# Patient Record
Sex: FEMALE | Race: WHITE | NOT HISPANIC OR LATINO | Employment: OTHER | URBAN - METROPOLITAN AREA
[De-identification: names, ages, dates, MRNs, and addresses within clinical notes are randomized per-mention and may not be internally consistent; named-entity substitution may affect disease eponyms.]

---

## 2023-11-09 ENCOUNTER — HOSPITAL ENCOUNTER (OUTPATIENT)
Facility: HOSPITAL | Age: 78
Setting detail: OBSERVATION
Discharge: HOME HEALTH | End: 2023-11-16
Attending: EMERGENCY MEDICINE | Admitting: STUDENT IN AN ORGANIZED HEALTH CARE EDUCATION/TRAINING PROGRAM
Payer: MEDICARE

## 2023-11-09 ENCOUNTER — APPOINTMENT (OUTPATIENT)
Dept: CT IMAGING | Facility: HOSPITAL | Age: 78
End: 2023-11-09
Payer: MEDICARE

## 2023-11-09 ENCOUNTER — APPOINTMENT (OUTPATIENT)
Dept: GENERAL RADIOLOGY | Facility: HOSPITAL | Age: 78
End: 2023-11-09
Payer: MEDICARE
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
## 2023-11-09 DIAGNOSIS — N39.0 URINARY TRACT INFECTION WITHOUT HEMATURIA, SITE UNSPECIFIED: ICD-10-CM

## 2023-11-09 DIAGNOSIS — E87.6 HYPOKALEMIA: ICD-10-CM

## 2023-11-09 DIAGNOSIS — R53.1 GENERALIZED WEAKNESS: Primary | ICD-10-CM

## 2023-11-09 LAB
ALBUMIN SERPL-MCNC: 3.3 G/DL (ref 3.5–5.2)
ALBUMIN/GLOB SERPL: 0.9 G/DL
ALP SERPL-CCNC: 166 U/L (ref 39–117)
ALT SERPL W P-5'-P-CCNC: 56 U/L (ref 1–33)
ANION GAP SERPL CALCULATED.3IONS-SCNC: 12 MMOL/L (ref 5–15)
AST SERPL-CCNC: 82 U/L (ref 1–32)
BACTERIA UR QL AUTO: ABNORMAL /HPF
BASOPHILS # BLD AUTO: 0.03 10*3/MM3 (ref 0–0.2)
BASOPHILS NFR BLD AUTO: 0.1 % (ref 0–1.5)
BILIRUB SERPL-MCNC: 0.5 MG/DL (ref 0–1.2)
BILIRUB UR QL STRIP: NEGATIVE
BUN SERPL-MCNC: 45 MG/DL (ref 8–23)
BUN/CREAT SERPL: 40.2 (ref 7–25)
CALCIUM SPEC-SCNC: 9.6 MG/DL (ref 8.6–10.5)
CHLORIDE SERPL-SCNC: 106 MMOL/L (ref 98–107)
CK SERPL-CCNC: 95 U/L (ref 20–180)
CLARITY UR: ABNORMAL
CO2 SERPL-SCNC: 24 MMOL/L (ref 22–29)
COLOR UR: YELLOW
CREAT SERPL-MCNC: 1.12 MG/DL (ref 0.57–1)
D-LACTATE SERPL-SCNC: 1.3 MMOL/L (ref 0.5–2)
DEPRECATED RDW RBC AUTO: 40.8 FL (ref 37–54)
EGFRCR SERPLBLD CKD-EPI 2021: 50.4 ML/MIN/1.73
EOSINOPHIL # BLD AUTO: 0 10*3/MM3 (ref 0–0.4)
EOSINOPHIL NFR BLD AUTO: 0 % (ref 0.3–6.2)
ERYTHROCYTE [DISTWIDTH] IN BLOOD BY AUTOMATED COUNT: 12.5 % (ref 12.3–15.4)
ETHANOL BLD-MCNC: <10 MG/DL (ref 0–10)
ETHANOL UR QL: <0.01 %
GLOBULIN UR ELPH-MCNC: 3.8 GM/DL
GLUCOSE SERPL-MCNC: 93 MG/DL (ref 65–99)
GLUCOSE UR STRIP-MCNC: NEGATIVE MG/DL
HCT VFR BLD AUTO: 35.2 % (ref 34–46.6)
HGB BLD-MCNC: 11.9 G/DL (ref 12–15.9)
HGB UR QL STRIP.AUTO: ABNORMAL
HYALINE CASTS UR QL AUTO: ABNORMAL /LPF
IMM GRANULOCYTES # BLD AUTO: 0.16 10*3/MM3 (ref 0–0.05)
IMM GRANULOCYTES NFR BLD AUTO: 0.8 % (ref 0–0.5)
KETONES UR QL STRIP: ABNORMAL
LEUKOCYTE ESTERASE UR QL STRIP.AUTO: ABNORMAL
LYMPHOCYTES # BLD AUTO: 1.55 10*3/MM3 (ref 0.7–3.1)
LYMPHOCYTES NFR BLD AUTO: 7.7 % (ref 19.6–45.3)
MAGNESIUM SERPL-MCNC: 1.9 MG/DL (ref 1.6–2.4)
MCH RBC QN AUTO: 29.9 PG (ref 26.6–33)
MCHC RBC AUTO-ENTMCNC: 33.8 G/DL (ref 31.5–35.7)
MCV RBC AUTO: 88.4 FL (ref 79–97)
MONOCYTES # BLD AUTO: 2.83 10*3/MM3 (ref 0.1–0.9)
MONOCYTES NFR BLD AUTO: 14.1 % (ref 5–12)
NEUTROPHILS NFR BLD AUTO: 15.55 10*3/MM3 (ref 1.7–7)
NEUTROPHILS NFR BLD AUTO: 77.3 % (ref 42.7–76)
NITRITE UR QL STRIP: POSITIVE
NRBC BLD AUTO-RTO: 0 /100 WBC (ref 0–0.2)
PH UR STRIP.AUTO: 6 [PH] (ref 5–8)
PLATELET # BLD AUTO: 341 10*3/MM3 (ref 140–450)
PMV BLD AUTO: 11.2 FL (ref 6–12)
POTASSIUM SERPL-SCNC: 2.8 MMOL/L (ref 3.5–5.2)
PROT SERPL-MCNC: 7.1 G/DL (ref 6–8.5)
PROT UR QL STRIP: ABNORMAL
RBC # BLD AUTO: 3.98 10*6/MM3 (ref 3.77–5.28)
RBC # UR STRIP: ABNORMAL /HPF
REF LAB TEST METHOD: ABNORMAL
SODIUM SERPL-SCNC: 142 MMOL/L (ref 136–145)
SP GR UR STRIP: 1.02 (ref 1–1.03)
SQUAMOUS #/AREA URNS HPF: ABNORMAL /HPF
UROBILINOGEN UR QL STRIP: ABNORMAL
WBC # UR STRIP: ABNORMAL /HPF
WBC NRBC COR # BLD: 20.12 10*3/MM3 (ref 3.4–10.8)

## 2023-11-09 PROCEDURE — 82550 ASSAY OF CK (CPK): CPT | Performed by: EMERGENCY MEDICINE

## 2023-11-09 PROCEDURE — 99284 EMERGENCY DEPT VISIT MOD MDM: CPT

## 2023-11-09 PROCEDURE — 70450 CT HEAD/BRAIN W/O DYE: CPT

## 2023-11-09 PROCEDURE — G0378 HOSPITAL OBSERVATION PER HR: HCPCS

## 2023-11-09 PROCEDURE — 85025 COMPLETE CBC W/AUTO DIFF WBC: CPT | Performed by: EMERGENCY MEDICINE

## 2023-11-09 PROCEDURE — 80053 COMPREHEN METABOLIC PANEL: CPT | Performed by: EMERGENCY MEDICINE

## 2023-11-09 PROCEDURE — 25810000003 SODIUM CHLORIDE 0.9 % SOLUTION: Performed by: EMERGENCY MEDICINE

## 2023-11-09 PROCEDURE — 87186 SC STD MICRODIL/AGAR DIL: CPT | Performed by: EMERGENCY MEDICINE

## 2023-11-09 PROCEDURE — 25010000002 CEFTRIAXONE PER 250 MG: Performed by: EMERGENCY MEDICINE

## 2023-11-09 PROCEDURE — 87040 BLOOD CULTURE FOR BACTERIA: CPT | Performed by: EMERGENCY MEDICINE

## 2023-11-09 PROCEDURE — 83735 ASSAY OF MAGNESIUM: CPT | Performed by: PHYSICIAN ASSISTANT

## 2023-11-09 PROCEDURE — 36415 COLL VENOUS BLD VENIPUNCTURE: CPT

## 2023-11-09 PROCEDURE — 71045 X-RAY EXAM CHEST 1 VIEW: CPT

## 2023-11-09 PROCEDURE — 87086 URINE CULTURE/COLONY COUNT: CPT | Performed by: EMERGENCY MEDICINE

## 2023-11-09 PROCEDURE — 81001 URINALYSIS AUTO W/SCOPE: CPT | Performed by: EMERGENCY MEDICINE

## 2023-11-09 PROCEDURE — 96365 THER/PROPH/DIAG IV INF INIT: CPT

## 2023-11-09 PROCEDURE — 82077 ASSAY SPEC XCP UR&BREATH IA: CPT | Performed by: STUDENT IN AN ORGANIZED HEALTH CARE EDUCATION/TRAINING PROGRAM

## 2023-11-09 PROCEDURE — 72125 CT NECK SPINE W/O DYE: CPT

## 2023-11-09 PROCEDURE — 83605 ASSAY OF LACTIC ACID: CPT | Performed by: EMERGENCY MEDICINE

## 2023-11-09 RX ORDER — POTASSIUM CHLORIDE 750 MG/1
40 TABLET, FILM COATED, EXTENDED RELEASE ORAL ONCE
Status: COMPLETED | OUTPATIENT
Start: 2023-11-09 | End: 2023-11-09

## 2023-11-09 RX ORDER — SODIUM CHLORIDE 0.9 % (FLUSH) 0.9 %
10 SYRINGE (ML) INJECTION AS NEEDED
Status: DISCONTINUED | OUTPATIENT
Start: 2023-11-09 | End: 2023-11-16 | Stop reason: HOSPADM

## 2023-11-09 RX ORDER — SODIUM CHLORIDE 9 MG/ML
40 INJECTION, SOLUTION INTRAVENOUS AS NEEDED
Status: DISCONTINUED | OUTPATIENT
Start: 2023-11-09 | End: 2023-11-16 | Stop reason: HOSPADM

## 2023-11-09 RX ORDER — SODIUM CHLORIDE 9 MG/ML
75 INJECTION, SOLUTION INTRAVENOUS CONTINUOUS
Status: DISCONTINUED | OUTPATIENT
Start: 2023-11-09 | End: 2023-11-14

## 2023-11-09 RX ORDER — SODIUM CHLORIDE 0.9 % (FLUSH) 0.9 %
10 SYRINGE (ML) INJECTION EVERY 12 HOURS SCHEDULED
Status: DISCONTINUED | OUTPATIENT
Start: 2023-11-09 | End: 2023-11-16 | Stop reason: HOSPADM

## 2023-11-09 RX ORDER — AMOXICILLIN 250 MG
2 CAPSULE ORAL 2 TIMES DAILY
Status: DISCONTINUED | OUTPATIENT
Start: 2023-11-09 | End: 2023-11-16 | Stop reason: HOSPADM

## 2023-11-09 RX ORDER — BISACODYL 10 MG
10 SUPPOSITORY, RECTAL RECTAL DAILY PRN
Status: DISCONTINUED | OUTPATIENT
Start: 2023-11-09 | End: 2023-11-16 | Stop reason: HOSPADM

## 2023-11-09 RX ORDER — BISACODYL 5 MG/1
5 TABLET, DELAYED RELEASE ORAL DAILY PRN
Status: DISCONTINUED | OUTPATIENT
Start: 2023-11-09 | End: 2023-11-16 | Stop reason: HOSPADM

## 2023-11-09 RX ORDER — POLYETHYLENE GLYCOL 3350 17 G/17G
17 POWDER, FOR SOLUTION ORAL DAILY PRN
Status: DISCONTINUED | OUTPATIENT
Start: 2023-11-09 | End: 2023-11-16 | Stop reason: HOSPADM

## 2023-11-09 RX ORDER — GABAPENTIN 800 MG/1
800 TABLET ORAL AS NEEDED
COMMUNITY
End: 2023-11-16 | Stop reason: HOSPADM

## 2023-11-09 RX ORDER — POTASSIUM CHLORIDE 1.5 G/1.58G
40 POWDER, FOR SOLUTION ORAL 2 TIMES DAILY
Status: COMPLETED | OUTPATIENT
Start: 2023-11-10 | End: 2023-11-10

## 2023-11-09 RX ADMIN — SODIUM CHLORIDE 500 ML: 9 INJECTION, SOLUTION INTRAVENOUS at 18:10

## 2023-11-09 RX ADMIN — CEFTRIAXONE SODIUM 1000 MG: 1 INJECTION, POWDER, FOR SOLUTION INTRAMUSCULAR; INTRAVENOUS at 19:51

## 2023-11-09 RX ADMIN — POTASSIUM CHLORIDE 40 MEQ: 750 TABLET, EXTENDED RELEASE ORAL at 20:49

## 2023-11-09 NOTE — ED PROVIDER NOTES
EMERGENCY DEPARTMENT ENCOUNTER    Room Number:  E667/1  PCP: Dary Mcbride APRN  Patient Care Team:  Dary Mcbride APRN as PCP - General (Family Medicine)   Independent Historians: Patient, family, EMS    HPI:  Chief Complaint: Fall    A complete HPI/ROS/PMH/PSH/SH/FH are unobtainable due to: Nothing    Chronic or social conditions impacting patient care (Social Determinants of Health): None  (Financial Resource Strain / Food Insecurity / Transportation Needs / Physical Activity / Stress / Social Connections / Intimate Partner Violence / Housing Stability)    Context: Sammi Curtis is a 78 y.o. female who presents to the ED c/o acute fall.  The patient reports that she was walking in her room last night and she got too weak to stand and she fell.  She reports she struck her head.  She reports no loss of consciousness.  She reports pain to her neck.  She states she was too weak to get up so she laid on the ground all night.  She denies prior similar episodes.  She denies any other injury.  Family found her today and came here.  She denies any recent illness.  She has no chest pain or shortness of breath.        Prescription drug monitoring program review:         PAST MEDICAL HISTORY  Active Ambulatory Problems     Diagnosis Date Noted    No Active Ambulatory Problems     Resolved Ambulatory Problems     Diagnosis Date Noted    No Resolved Ambulatory Problems     Past Medical History:   Diagnosis Date    Arthritis     Disease of thyroid gland     Elevated cholesterol          PAST SURGICAL HISTORY  History reviewed. No pertinent surgical history.      FAMILY HISTORY  History reviewed. No pertinent family history.      SOCIAL HISTORY  Social History     Socioeconomic History    Marital status:    Tobacco Use    Smoking status: Former     Packs/day: 2.00     Years: 25.00     Additional pack years: 0.00     Total pack years: 50.00     Types: Cigarettes     Quit date: 1991     Years since  quittin.8    Smokeless tobacco: Never   Vaping Use    Vaping Use: Never used   Substance and Sexual Activity    Alcohol use: Never    Drug use: Never    Sexual activity: Defer         ALLERGIES  Patient has no known allergies.        REVIEW OF SYSTEMS  Review of Systems  Included in HPI  All systems reviewed and negative except for those discussed in HPI.      PHYSICAL EXAM    I have reviewed the triage vital signs and nursing notes.    ED Triage Vitals [23 1652]   Temp Heart Rate Resp BP SpO2   98.2 °F (36.8 °C) 76 18 138/60 94 %      Temp src Heart Rate Source Patient Position BP Location FiO2 (%)   Oral Monitor Sitting Left arm --       Physical Exam  GENERAL: Awake, alert, no acute distress smells strongly of urine.  SKIN: Warm, dry  HENT: Normocephalic, atraumatic  EYES: no scleral icterus  CV: regular rhythm, regular rate  RESPIRATORY: normal effort, lungs clear  ABDOMEN: soft, nontender, nondistended  MUSCULOSKELETAL: no deformity, equal lower extremity strength and sensation.  No thoracic or lumbar spine tenderness.  She has mild diffuse cervical spine tenderness.  No tenderness to the hips, knees, elbows or shoulders.  NEURO: alert, moves all extremities, follows commands                                                               LAB RESULTS  Recent Results (from the past 24 hour(s))   Basic Metabolic Panel    Collection Time: 11/10/23  6:42 AM    Specimen: Blood   Result Value Ref Range    Glucose 107 (H) 65 - 99 mg/dL    BUN 35 (H) 8 - 23 mg/dL    Creatinine 1.01 (H) 0.57 - 1.00 mg/dL    Sodium 143 136 - 145 mmol/L    Potassium 3.1 (L) 3.5 - 5.2 mmol/L    Chloride 109 (H) 98 - 107 mmol/L    CO2 24.4 22.0 - 29.0 mmol/L    Calcium 9.0 8.6 - 10.5 mg/dL    BUN/Creatinine Ratio 34.7 (H) 7.0 - 25.0    Anion Gap 9.6 5.0 - 15.0 mmol/L    eGFR 57.1 (L) >60.0 mL/min/1.73   CBC (No Diff)    Collection Time: 11/10/23  6:42 AM    Specimen: Blood   Result Value Ref Range    WBC 16.58 (H) 3.40 - 10.80  10*3/mm3    RBC 3.47 (L) 3.77 - 5.28 10*6/mm3    Hemoglobin 10.5 (L) 12.0 - 15.9 g/dL    Hematocrit 30.9 (L) 34.0 - 46.6 %    MCV 89.0 79.0 - 97.0 fL    MCH 30.3 26.6 - 33.0 pg    MCHC 34.0 31.5 - 35.7 g/dL    RDW 12.8 12.3 - 15.4 %    RDW-SD 41.5 37.0 - 54.0 fl    MPV 10.9 6.0 - 12.0 fL    Platelets 287 140 - 450 10*3/mm3   Magnesium    Collection Time: 11/10/23  6:42 AM    Specimen: Blood   Result Value Ref Range    Magnesium 2.1 1.6 - 2.4 mg/dL   Hepatic Function Panel    Collection Time: 11/10/23  6:42 AM    Specimen: Blood   Result Value Ref Range    Total Protein 6.3 6.0 - 8.5 g/dL    Albumin 2.9 (L) 3.5 - 5.2 g/dL    ALT (SGPT) 74 (H) 1 - 33 U/L    AST (SGOT) 115 (H) 1 - 32 U/L    Alkaline Phosphatase 144 (H) 39 - 117 U/L    Total Bilirubin 0.4 0.0 - 1.2 mg/dL    Bilirubin, Direct <0.2 0.0 - 0.3 mg/dL    Bilirubin, Indirect     Hepatitis Panel, Acute    Collection Time: 11/10/23  6:47 AM    Specimen: Blood   Result Value Ref Range    Hepatitis B Surface Ag Non-Reactive Non-Reactive    Hep A IgM Non-Reactive Non-Reactive    Hep B C IgM Non-Reactive Non-Reactive    Hepatitis C Ab Non-Reactive Non-Reactive       Ordered the above labs and independently reviewed the results.        RADIOLOGY  No Radiology Exams Resulted Within Past 24 Hours    I ordered the above noted radiological studies. Reviewed by me and discussed with radiologist.  See dictation for official radiology interpretation.      PROCEDURES    Procedures      MEDICATIONS GIVEN IN ER    Medications   sodium chloride 0.9 % flush 10 mL (has no administration in time range)   sodium chloride 0.9 % flush 10 mL (10 mL Intravenous Given 11/10/23 2033)   sodium chloride 0.9 % flush 10 mL (has no administration in time range)   sodium chloride 0.9 % infusion 40 mL (has no administration in time range)   sennosides-docusate (PERICOLACE) 8.6-50 MG per tablet 2 tablet (2 tablets Oral Given 11/10/23 2033)     And   polyethylene glycol (MIRALAX) packet 17 g (has  no administration in time range)     And   bisacodyl (DULCOLAX) EC tablet 5 mg (has no administration in time range)     And   bisacodyl (DULCOLAX) suppository 10 mg (has no administration in time range)   sodium chloride 0.9 % infusion (0 mL/hr Intravenous Hold 11/10/23 1013)   calcium carbonate (TUMS) chewable tablet 500 mg (200 mg elemental) (2 tablets Oral Given 11/10/23 2032)   Potassium Replacement - Follow Nurse / BPA Driven Protocol (has no administration in time range)   Magnesium Standard Dose Replacement - Follow Nurse / BPA Driven Protocol (has no administration in time range)   Phosphorus Replacement - Follow Nurse / BPA Driven Protocol (has no administration in time range)   Calcium Replacement - Follow Nurse / BPA Driven Protocol (has no administration in time range)   levoFLOXacin (LEVAQUIN) tablet 750 mg (750 mg Oral Given 11/10/23 1437)   levothyroxine (SYNTHROID, LEVOTHROID) tablet 112 mcg (has no administration in time range)   losartan (COZAAR) tablet 25 mg (25 mg Oral Given 11/10/23 1437)   sertraline (ZOLOFT) tablet 50 mg (50 mg Oral Given 11/10/23 1437)   Enoxaparin Sodium (LOVENOX) syringe 40 mg (40 mg Subcutaneous Given 11/10/23 1437)   sodium chloride 0.9 % bolus 500 mL (0 mL Intravenous Stopped 11/9/23 1854)   cefTRIAXone (ROCEPHIN) 1,000 mg in sodium chloride 0.9 % 100 mL IVPB-VTB (0 mg Intravenous Stopped 11/9/23 2033)   potassium chloride (K-DUR,KLOR-CON) ER tablet 40 mEq (40 mEq Oral Given 11/9/23 2049)   potassium chloride (KLOR-CON) packet 40 mEq (40 mEq Oral Given 11/10/23 2032)         ORDERS PLACED DURING THIS VISIT:  Orders Placed This Encounter   Procedures    Blood Culture - Blood,    Blood Culture - Blood,    Urine Culture - Urine,    CT Head Without Contrast    CT Cervical Spine Without Contrast    XR Chest 1 View    Comprehensive Metabolic Panel    Urinalysis With Microscopic If Indicated (No Culture) - Urine, Clean Catch    CK    CBC Auto Differential    Urinalysis,  Microscopic Only - Urine, Clean Catch    Lactic Acid, Plasma    Magnesium    Basic Metabolic Panel    CBC (No Diff)    Hepatitis Panel, Acute    Ethanol    Magnesium    Hepatic Function Panel    Comprehensive Metabolic Panel    CBC (No Diff)    Diet: Regular/House Diet; Texture: Regular Texture (IDDSI 7); Fluid Consistency: Thin (IDDSI 0)    Monitor Blood Pressure    Pulse Oximetry, Continuous    Vital Signs    Intake & Output    Weigh Patient    Oral Care    Saline Lock & Maintain IV Access    Place Sequential Compression Device    Maintain Sequential Compression Device    Code Status and Medical Interventions:    LHA (on-call MD unless specified) Details    OT Consult: Eval & Treat    OT Plan of Care Cert / Re-Cert    PT Consult: Eval & Treat Discharge Placement Assessment    SCANNED - TELEMETRY      SCANNED - TELEMETRY      SCANNED - TELEMETRY      SCANNED - TELEMETRY      SCANNED - TELEMETRY      Insert Peripheral IV    Insert Peripheral IV    Initiate Observation Status    CBC & Differential         PROGRESS, DATA ANALYSIS, CONSULTS, AND MEDICAL DECISION MAKING    All labs have been independently interpreted by me.  All radiology studies have been reviewed by me and discussed with radiologist dictating the report.   EKG's independently viewed and interpreted by me.  Discussion below represents my analysis of pertinent findings related to patient's condition, differential diagnosis, treatment plan and final disposition.    Differential diagnosis includes but is not limited to rhabdomyolysis, UTI, metabolic derangement.    ED Course as of 11/11/23 0003   u Nov 09, 2023   1759 XR Chest 1 View  My independent interpretation of the chest x-ray is no dense consolidation [TR]   1925 The patient has a UTI and generalized weakness.  Starting antibiotics.  Plan admission for further treatment.  The patient and family are agreeable. [TR]   2042 Care transferred to Ean lewis PA-C pending hospitalist admission. [TR]    2107 Discussed the patient's case with Dr Arreaga.  To admit to his care in tele/obs status. [RC]      ED Course User Index  [RC] [employer] PA  [TR] Boyd Hough MD                  AS OF 00:03 EST VITALS:    BP - 138/75  HR - 88  TEMP - 99.5 °F (37.5 °C) (Oral)  O2 SATS - 94%        DIAGNOSIS  Final diagnoses:   Generalized weakness   Urinary tract infection without hematuria, site unspecified   Hypokalemia         DISPOSITION  ED Disposition       ED Disposition   Decision to Admit    Condition   --    Comment   Level of Care: Telemetry [5]   Diagnosis: Acute UTI [583640]   Admitting Physician: ROXIE ARREAGA [[id number]]   Attending Physician: ROXIE ARREAGA [[id number]]                    Note Disclaimer: At [facility], we believe that sharing information builds trust and better relationships. You are receiving this note because you recently visited [facility]. It is possible you will see health information before a provider has talked with you about it. This kind of information can be easy to misunderstand. To help you fully understand what it means for your health, we urge you to discuss this note with your provider.         Boyd Hough MD  11/09/23 2043       Boyd Hough MD  11/11/23 0003

## 2023-11-10 LAB
ALBUMIN SERPL-MCNC: 2.9 G/DL (ref 3.5–5.2)
ALP SERPL-CCNC: 144 U/L (ref 39–117)
ALT SERPL W P-5'-P-CCNC: 74 U/L (ref 1–33)
ANION GAP SERPL CALCULATED.3IONS-SCNC: 9.6 MMOL/L (ref 5–15)
AST SERPL-CCNC: 115 U/L (ref 1–32)
BILIRUB CONJ SERPL-MCNC: <0.2 MG/DL (ref 0–0.3)
BILIRUB INDIRECT SERPL-MCNC: ABNORMAL MG/DL
BILIRUB SERPL-MCNC: 0.4 MG/DL (ref 0–1.2)
BUN SERPL-MCNC: 35 MG/DL (ref 8–23)
BUN/CREAT SERPL: 34.7 (ref 7–25)
CALCIUM SPEC-SCNC: 9 MG/DL (ref 8.6–10.5)
CHLORIDE SERPL-SCNC: 109 MMOL/L (ref 98–107)
CO2 SERPL-SCNC: 24.4 MMOL/L (ref 22–29)
CREAT SERPL-MCNC: 1.01 MG/DL (ref 0.57–1)
DEPRECATED RDW RBC AUTO: 41.5 FL (ref 37–54)
EGFRCR SERPLBLD CKD-EPI 2021: 57.1 ML/MIN/1.73
ERYTHROCYTE [DISTWIDTH] IN BLOOD BY AUTOMATED COUNT: 12.8 % (ref 12.3–15.4)
GLUCOSE SERPL-MCNC: 107 MG/DL (ref 65–99)
HAV IGM SERPL QL IA: NORMAL
HBV CORE IGM SERPL QL IA: NORMAL
HBV SURFACE AG SERPL QL IA: NORMAL
HCT VFR BLD AUTO: 30.9 % (ref 34–46.6)
HCV AB SER DONR QL: NORMAL
HGB BLD-MCNC: 10.5 G/DL (ref 12–15.9)
MAGNESIUM SERPL-MCNC: 2.1 MG/DL (ref 1.6–2.4)
MCH RBC QN AUTO: 30.3 PG (ref 26.6–33)
MCHC RBC AUTO-ENTMCNC: 34 G/DL (ref 31.5–35.7)
MCV RBC AUTO: 89 FL (ref 79–97)
PLATELET # BLD AUTO: 287 10*3/MM3 (ref 140–450)
PMV BLD AUTO: 10.9 FL (ref 6–12)
POTASSIUM SERPL-SCNC: 3.1 MMOL/L (ref 3.5–5.2)
PROT SERPL-MCNC: 6.3 G/DL (ref 6–8.5)
RBC # BLD AUTO: 3.47 10*6/MM3 (ref 3.77–5.28)
SODIUM SERPL-SCNC: 143 MMOL/L (ref 136–145)
WBC NRBC COR # BLD: 16.58 10*3/MM3 (ref 3.4–10.8)

## 2023-11-10 PROCEDURE — 97162 PT EVAL MOD COMPLEX 30 MIN: CPT | Performed by: PHYSICAL THERAPIST

## 2023-11-10 PROCEDURE — 97166 OT EVAL MOD COMPLEX 45 MIN: CPT

## 2023-11-10 PROCEDURE — 80074 ACUTE HEPATITIS PANEL: CPT | Performed by: STUDENT IN AN ORGANIZED HEALTH CARE EDUCATION/TRAINING PROGRAM

## 2023-11-10 PROCEDURE — 97535 SELF CARE MNGMENT TRAINING: CPT

## 2023-11-10 PROCEDURE — 25010000002 ENOXAPARIN PER 10 MG: Performed by: STUDENT IN AN ORGANIZED HEALTH CARE EDUCATION/TRAINING PROGRAM

## 2023-11-10 PROCEDURE — 80076 HEPATIC FUNCTION PANEL: CPT | Performed by: STUDENT IN AN ORGANIZED HEALTH CARE EDUCATION/TRAINING PROGRAM

## 2023-11-10 PROCEDURE — 97530 THERAPEUTIC ACTIVITIES: CPT | Performed by: PHYSICAL THERAPIST

## 2023-11-10 PROCEDURE — G0378 HOSPITAL OBSERVATION PER HR: HCPCS

## 2023-11-10 PROCEDURE — 85027 COMPLETE CBC AUTOMATED: CPT | Performed by: STUDENT IN AN ORGANIZED HEALTH CARE EDUCATION/TRAINING PROGRAM

## 2023-11-10 PROCEDURE — 83735 ASSAY OF MAGNESIUM: CPT | Performed by: STUDENT IN AN ORGANIZED HEALTH CARE EDUCATION/TRAINING PROGRAM

## 2023-11-10 PROCEDURE — 96372 THER/PROPH/DIAG INJ SC/IM: CPT

## 2023-11-10 PROCEDURE — 25810000003 SODIUM CHLORIDE 0.9 % SOLUTION: Performed by: STUDENT IN AN ORGANIZED HEALTH CARE EDUCATION/TRAINING PROGRAM

## 2023-11-10 PROCEDURE — 96361 HYDRATE IV INFUSION ADD-ON: CPT

## 2023-11-10 PROCEDURE — 80048 BASIC METABOLIC PNL TOTAL CA: CPT | Performed by: STUDENT IN AN ORGANIZED HEALTH CARE EDUCATION/TRAINING PROGRAM

## 2023-11-10 RX ORDER — CALCIUM CARBONATE 500 MG/1
2 TABLET, CHEWABLE ORAL 3 TIMES DAILY PRN
Status: DISCONTINUED | OUTPATIENT
Start: 2023-11-10 | End: 2023-11-16 | Stop reason: HOSPADM

## 2023-11-10 RX ORDER — LEVOTHYROXINE SODIUM 112 UG/1
112 TABLET ORAL DAILY
COMMUNITY

## 2023-11-10 RX ORDER — LEVOTHYROXINE SODIUM 112 UG/1
112 TABLET ORAL
Status: DISCONTINUED | OUTPATIENT
Start: 2023-11-11 | End: 2023-11-16 | Stop reason: HOSPADM

## 2023-11-10 RX ORDER — LOSARTAN POTASSIUM 50 MG/1
50 TABLET ORAL DAILY
COMMUNITY
End: 2023-11-16 | Stop reason: HOSPADM

## 2023-11-10 RX ORDER — PANTOPRAZOLE SODIUM 40 MG/1
40 TABLET, DELAYED RELEASE ORAL DAILY
COMMUNITY

## 2023-11-10 RX ORDER — LEVOFLOXACIN 750 MG/1
750 TABLET ORAL EVERY 24 HOURS
Status: COMPLETED | OUTPATIENT
Start: 2023-11-10 | End: 2023-11-11

## 2023-11-10 RX ORDER — LOSARTAN POTASSIUM 25 MG/1
25 TABLET ORAL DAILY
Status: DISCONTINUED | OUTPATIENT
Start: 2023-11-10 | End: 2023-11-16 | Stop reason: HOSPADM

## 2023-11-10 RX ORDER — ATORVASTATIN CALCIUM 20 MG/1
20 TABLET, FILM COATED ORAL NIGHTLY
Status: ON HOLD | COMMUNITY
End: 2023-11-10

## 2023-11-10 RX ORDER — ENOXAPARIN SODIUM 100 MG/ML
40 INJECTION SUBCUTANEOUS EVERY 24 HOURS
Status: DISCONTINUED | OUTPATIENT
Start: 2023-11-10 | End: 2023-11-16 | Stop reason: HOSPADM

## 2023-11-10 RX ORDER — DICLOFENAC SODIUM AND MISOPROSTOL 50; 200 MG/1; UG/1
1 TABLET, DELAYED RELEASE ORAL 2 TIMES DAILY
COMMUNITY
End: 2023-11-16 | Stop reason: HOSPADM

## 2023-11-10 RX ORDER — POTASSIUM CHLORIDE 1.5 G/1.58G
40 POWDER, FOR SOLUTION ORAL EVERY 4 HOURS
Status: CANCELLED | OUTPATIENT
Start: 2023-11-10 | End: 2023-11-11

## 2023-11-10 RX ADMIN — DOCUSATE SODIUM 50MG AND SENNOSIDES 8.6MG 2 TABLET: 8.6; 5 TABLET, FILM COATED ORAL at 08:55

## 2023-11-10 RX ADMIN — LEVOFLOXACIN 750 MG: 750 TABLET, FILM COATED ORAL at 14:37

## 2023-11-10 RX ADMIN — Medication 10 ML: at 00:14

## 2023-11-10 RX ADMIN — SODIUM CHLORIDE 75 ML/HR: 9 INJECTION, SOLUTION INTRAVENOUS at 00:14

## 2023-11-10 RX ADMIN — LOSARTAN POTASSIUM 25 MG: 25 TABLET, FILM COATED ORAL at 14:37

## 2023-11-10 RX ADMIN — ENOXAPARIN SODIUM 40 MG: 100 INJECTION SUBCUTANEOUS at 14:37

## 2023-11-10 RX ADMIN — SERTRALINE 50 MG: 50 TABLET, FILM COATED ORAL at 14:37

## 2023-11-10 RX ADMIN — Medication 10 ML: at 08:55

## 2023-11-10 RX ADMIN — POTASSIUM CHLORIDE 40 MEQ: 1.5 FOR SOLUTION ORAL at 20:32

## 2023-11-10 RX ADMIN — ANTACID TABLETS 2 TABLET: 500 TABLET, CHEWABLE ORAL at 20:32

## 2023-11-10 RX ADMIN — POTASSIUM CHLORIDE 40 MEQ: 1.5 FOR SOLUTION ORAL at 08:55

## 2023-11-10 RX ADMIN — Medication 10 ML: at 20:33

## 2023-11-10 RX ADMIN — DOCUSATE SODIUM 50MG AND SENNOSIDES 8.6MG 2 TABLET: 8.6; 5 TABLET, FILM COATED ORAL at 20:33

## 2023-11-10 RX ADMIN — ANTACID TABLETS 2 TABLET: 500 TABLET, CHEWABLE ORAL at 13:31

## 2023-11-10 NOTE — PLAN OF CARE
Goal Outcome Evaluation:         Pt is alert and oriented. Pt arrived via transport to this floor from the ER. Pt could not remember her med list. Pt advise this nurse to call her daughter, spoke with who also did not have med list. Pt on RA. IVF infusing per order. Pt resting comfortably. Plan of care ongoing.           Pt's daughter called back with med list.

## 2023-11-10 NOTE — PLAN OF CARE
Goal Outcome Evaluation:  Plan of Care Reviewed With: patient           Outcome Evaluation: Pt admittedafter a fall at home. Pt was unable to get up and spent hours on the floor. At baseline, pt was independently mobile with 3 wheeled walker at home. She lives in a one story home with no stairs. Pt hopes to d/c back home with family to assist as needed. Today, pt was agreeable to therapy. She complained of soreness in both knee, but ambulated well with Rwx. Pt was forward flexed and gait was slow and cautious. Pt and family reports gait was near baseline. Endurance appears to be lower than baseline. Pt would benefit from PT to address strengthening and endurance while admitted and possibly with HH PT. Appears safe to d/c home with family to assist as needed. Unsure if she wants HH PT.      Anticipated Discharge Disposition (PT): home with assist, home with home health

## 2023-11-10 NOTE — CASE MANAGEMENT/SOCIAL WORK
Discharge Planning Assessment  [facility]     Patient Name: Sammi Curtis  MRN: [id number]  Today's Date: 11/10/2023    Admit Date: 11/9/2023    Plan: Plan home .    ELISSA Conrad RN   Discharge Needs Assessment       Row Name 11/10/23 0907       Living Environment    People in Home alone    Current Living Arrangements apartment    Potentially Unsafe Housing Conditions none    Primary Care Provided by self    Provides Primary Care For no one    Family Caregiver if Needed child(anuel), adult    Family Caregiver Names Daughter  ( Daylin Wei)    Quality of Family Relationships helpful;supportive    Able to Return to Prior Arrangements yes    Living Arrangement Comments Pt lives alone in a first floor apartment.       Resource/Environmental Concerns    Resource/Environmental Concerns none    Transportation Concerns none       Transition Planning    Patient/Family Anticipates Transition to home    Patient/Family Anticipated Services at Transition none    Transportation Anticipated family or friend will provide       Discharge Needs Assessment    Readmission Within the Last 30 Days no previous admission in last 30 days    Equipment Currently Used at Home cane, straight;shower chair;walker, rolling;wheelchair    Concerns to be Addressed no discharge needs identified;denies needs/concerns at this time    Anticipated Changes Related to Illness none    Equipment Needed After Discharge cane, straight;shower chair;walker, rolling;wheelchair, manual                   Discharge Plan       Row Name 11/10/23 0913       Plan    Plan Plan home .    ELISSA Conrad RN    Patient/Family in Agreement with Plan yes    Plan Comments FACE SHEET VERIFIED/ GERARDO SIGNED.   Spoke with pt at bedside.  Pt's PCP is JAZMYN Prather.  Pt lives alone in a first floor apartment.  Pt is independent with ADLs.  Pt has a came, shower chair, rolling walker and a wheelchair for home use if needed.  Pt gets her prescriptions at [facility]  [address]).  Pt denies any issues affording medications. Pt is not current with HH.  Pt has not been in SNF.  Pt denies any discharge needs.  Pt's daughter will assist pt at home if needed and will transport pt home. Plan home .   ELISSA Conrad RN                  Continued Care and Services - Admitted Since 11/9/2023    Coordination has not been started for this encounter.       Expected Discharge Date and Time       Expected Discharge Date Expected Discharge Time    Nov 13, 2023            Demographic Summary       Row Name 11/10/23 0906       General Information    Admission Type observation    Arrived From emergency department    Required Notices Provided Observation Status Notice    Referral Source admission list    Reason for Consult discharge planning    Preferred Language English                   Functional Status       Row Name 11/10/23 0907       Functional Status    Usual Activity Tolerance good    Current Activity Tolerance moderate       Functional Status, IADL    Medications independent    Meal Preparation independent    Housekeeping independent    Laundry independent    Shopping independent       Mental Status    General Appearance WDL WDL                   Psychosocial    No documentation.                  Abuse/Neglect    No documentation.                  Legal    No documentation.                  Substance Abuse    No documentation.                  Patient Forms    No documentation.                     Yolande Conrad, RN

## 2023-11-10 NOTE — PROGRESS NOTES
"    Name: Sammi Curtis ADMIT: 2023   : 1945  PCP: Dary Mcbride APRN    MRN: [id number] LOS: 0 days   AGE/SEX: 78 y.o. female  ROOM: [facility]     Subjective     WBC improving   Objective   Objective   Vital Signs  Temp:  [98.2 °F (36.8 °C)-98.6 °F (37 °C)] 98.6 °F (37 °C)  Heart Rate:  [68-82] 81  Resp:  [18] 18  BP: (128-158)/(60-89) 133/65  SpO2:  [94 %-97 %] 97 %  on   ;   Device (Oxygen Therapy): room air  Body mass index is 29.63 kg/m².  Physical Exam    General: Alert and oriented x3, no acute distress  HEENT: Normocephalic, atraumatic  CV: Regular rate and rhythm, no murmurs rubs or gallops  Lungs: Clear to auscultation bilaterally, no crackles or wheezes  Abdomen: Soft, nontender, nondistended  Neuro: CN II-XII intact, no FND appreciated     Results Review     I reviewed the patient's new clinical results.  Results from last 7 days   Lab Units 11/10/23  0642 23  1752   WBC 10*3/mm3 16.58* 20.12*   HEMOGLOBIN g/dL 10.5* 11.9*   PLATELETS 10*3/mm3 287 341     Results from last 7 days   Lab Units 11/10/23  0642 11/09/23  1944   SODIUM mmol/L 143 142   POTASSIUM mmol/L 3.1* 2.8*   CHLORIDE mmol/L 109* 106   CO2 mmol/L 24.4 24.0   BUN mg/dL 35* 45*   CREATININE mg/dL 1.01* 1.12*   GLUCOSE mg/dL 107* 93   Estimated Creatinine Clearance: 43.1 mL/min (A) (by C-G formula based on SCr of 1.01 mg/dL (H)).  Results from last 7 days   Lab Units 11/10/23  0642 11/09/23  1944   ALBUMIN g/dL 2.9* 3.3*   BILIRUBIN mg/dL 0.4 0.5   ALK PHOS U/L 144* 166*   AST (SGOT) U/L 115* 82*   ALT (SGPT) U/L 74* 56*     Results from last 7 days   Lab Units 11/10/23  0642 23   CALCIUM mg/dL 9.0 9.6   ALBUMIN g/dL 2.9* 3.3*   MAGNESIUM mg/dL 2.1 1.9     Results from last 7 days   Lab Units 23   LACTATE mmol/L 1.3   No results found for: \"COVID19\"  No results found for: \"HGBA1C\", \"POCGLU\"  No results found for this or any previous visit.      CT Head Without Contrast, CT Cervical Spine " Without Contrast  Narrative: CT HEAD AND CERVICAL SPINE WITHOUT CONTRAST     CLINICAL HISTORY: [Fall]     TECHNIQUE: CT scan of the head and cervical spine was obtained with  axial soft tissue and bone algorithm images. No intravenous contrast was  administered. Sagittal and coronal reconstructions were obtained.     COMPARISON: [None available]     FINDINGS:  No intracranial hemorrhage.  No midline shift or mass effect.   No CT evidence of acute territorial infarction.  No extraaxial  collection.  No hydrocephalus.  Clear visualized portions of the  paranasal sinuses and mastoid air cells.     Straightening of the normal cervical lordosis with minimal C3 on C4  anterolisthesis and minimal C5 on C6 retrolisthesis. Multilevel disc  degeneration, moderate at C4-C7. Multilevel facet arthropathy, moderate  bilaterally at C2-C6. Likely mild spinal canal stenosis at C4-C7  secondary to posterior disc osteophyte complexes. No prevertebral soft  tissue swelling.        Impression: 1. No acute intracranial abnormality.  2. No cervical spine fracture or traumatic subluxation. Degenerative  changes as above.              Radiation dose reduction techniques were utilized, including automated  exposure control and exposure modulation based on body size.     This report was finalized on 11/9/2023 7:13 PM by Dr. Rashad Quintero M.D on Workstation: [id number]     XR Chest 1 View  Narrative: Portable chest radiograph     HISTORY: Weakness     TECHNIQUE: Single AP portable radiograph of the chest     COMPARISON: None     Impression: FINDINGS AND IMPRESSION:  Minimal atelectasis and or pleural parenchymal scarring is present  within the bilateral lung bases. No pneumothorax or pleural effusion is  seen. Cardiac silhouette is within normal its for size. Mild asymmetric  elevation of the right ami diaphragm.     This report was finalized on 11/9/2023 7:09 PM by Dr. Davis Sanchez M.D  on Workstation: [id number]       Scheduled  Medications  cefTRIAXone, 1,000 mg, Intravenous, Q24H  potassium chloride, 40 mEq, Oral, BID  senna-docusate sodium, 2 tablet, Oral, BID  sodium chloride, 10 mL, Intravenous, Q12H    Infusions  sodium chloride, 75 mL/hr, Last Rate: Stopped (11/10/23 1013)    Diet  Diet: Regular/House Diet; Texture: Regular Texture (IDDSI 7); Fluid Consistency: Thin (IDDSI 0)       Assessment/Plan     Active Hospital Problems    Diagnosis  POA    **Acute UTI [N39.0]  Yes      Resolved Hospital Problems   No resolved problems to display.       78 y.o. female admitted with Acute UTI.    Acute urinary tract infection  -Initially on rocephin however LFTs worsening. DC and start levaquin   Gentle fluids  Follow-up blood and urine cultures-currently pending     Generalized weakness  Fall  PT/OT evaluation management     Hypokalemia  Replace as needed  Check magnesium     Transaminitis  AST and ALT are slightly more elevated.  She was on atorvastatin which will be discontinued.  Ceftriaxone can also be implicated in cholestasis with some possible elevated LFTs initially so transitioned to Levaquin.  Check hepatitis panel-negative  Denies alcohol use       SCDs for DVT prophylaxis.  Full code.  Discussed with patient and nursing staff.  Anticipate discharge tbd, when arrangements have been made       Tacos Arreaga MD  [facility]  11/10/23  13:27 EST    Copied text on this note has been reviewed and updated as of 11/10/23

## 2023-11-10 NOTE — H&P
Patient Name:  Sammi Curtis  YOB: 1945  MRN:  [id number]  Admit Date:  11/9/2023  Patient Care Team:  Dary Mcbride APRN as PCP - General (Family Medicine)      Subjective   History Present Illness     Chief Complaint   Patient presents with    Fall     This is a 78-year-old woman with apparently no significant medical history who presents to the hospital after experiencing a second UTI in 1 month, as well as a fall.  She was walking in her room on the night prior to arrival, she felt that she was too weak to stand and then she fell.  She hit her head without any loss of consciousness.  Because she was too weak to get up she laid on the ground all night.  Family found her following and brought to the hospital for further management  In the emergency department, work-up included basic metabolic panel which revealed a potassium level of 2.8, creatinine of 1.12, white blood cell count elevation of 20.12.  Urinalysis was revealing of a urinary tract infection.  Blood cultures and urine cultures were obtained.  CT of the head and cervical spine did not reveal any acute abnormalities.      Personal History     No past medical history on file.  No past surgical history on file.  No family history on file.     No current facility-administered medications on file prior to encounter.     No current outpatient medications on file prior to encounter.     No Known Allergies    Objective    Objective     Vital Signs  Temp:  [98.2 °F (36.8 °C)] 98.2 °F (36.8 °C)  Heart Rate:  [68-82] 78  Resp:  [18] 18  BP: (128-158)/(60-89) 158/79  SpO2:  [94 %-97 %] 95 %  on   ;   Device (Oxygen Therapy): room air  Body mass index is 29.63 kg/m².    Physical Exam  Constitutional:       General: She is not in acute distress.  HENT:      Head: Normocephalic and atraumatic.   Eyes:      Extraocular Movements: Extraocular movements intact.      Pupils: Pupils are equal, round, and reactive to light.   Cardiovascular:       Rate and Rhythm: Normal rate and regular rhythm.   Pulmonary:      Effort: Pulmonary effort is normal. No respiratory distress.   Abdominal:      General: There is no distension.      Palpations: Abdomen is soft.      Tenderness: There is no abdominal tenderness.   Musculoskeletal:      Right lower leg: No edema.      Left lower leg: No edema.   Skin:     General: Skin is warm and dry.   Neurological:      General: No focal deficit present.      Mental Status: She is alert and oriented to person, place, and time.      Motor: Weakness present.         Results Review:  I reviewed the patient's new clinical results.  I reviewed the patient's new imaging results and agree with the interpretation.  I reviewed the patient's other test results and agree with the interpretation  I personally viewed and interpreted the patient's EKG/Telemetry data  Discussed with ED provider.    Lab Results (last 24 hours)       Procedure Component Value Units Date/Time    CBC & Differential [[id number]]  (Abnormal) Collected: 11/09/23 1752    Specimen: Blood Updated: 11/09/23 1802    Narrative:      The following orders were created for panel order CBC & Differential.  Procedure                               Abnormality         Status                     ---------                               -----------         ------                     CBC Auto Differential[[id number]]        Abnormal            Final result                 Please view results for these tests on the individual orders.    CBC Auto Differential [[id number]]  (Abnormal) Collected: 11/09/23 1752    Specimen: Blood Updated: 11/09/23 1802     WBC 20.12 10*3/mm3      RBC 3.98 10*6/mm3      Hemoglobin 11.9 g/dL      Hematocrit 35.2 %      MCV 88.4 fL      MCH 29.9 pg      MCHC 33.8 g/dL      RDW 12.5 %      RDW-SD 40.8 fl      MPV 11.2 fL      Platelets 341 10*3/mm3      Neutrophil % 77.3 %      Lymphocyte % 7.7 %      Monocyte % 14.1 %      Eosinophil % 0.0 %      Basophil % 0.1  %      Immature Grans % 0.8 %      Neutrophils, Absolute 15.55 10*3/mm3      Lymphocytes, Absolute 1.55 10*3/mm3      Monocytes, Absolute 2.83 10*3/mm3      Eosinophils, Absolute 0.00 10*3/mm3      Basophils, Absolute 0.03 10*3/mm3      Immature Grans, Absolute 0.16 10*3/mm3      nRBC 0.0 /100 WBC     Urinalysis With Microscopic If Indicated (No Culture) - Urine, Clean Catch [[id number]]  (Abnormal) Collected: 11/09/23 1855    Specimen: Urine, Clean Catch Updated: 11/09/23 1913     Color, UA Yellow     Appearance, UA Turbid     pH, UA 6.0     Specific Gravity, UA 1.019     Glucose, UA Negative     Ketones, UA 15 mg/dL (1+)     Bilirubin, UA Negative     Blood, UA Moderate (2+)     Protein,  mg/dL (2+)     Leuk Esterase, UA Large (3+)     Nitrite, UA Positive     Urobilinogen, UA 1.0 E.U./dL    Urinalysis, Microscopic Only - Urine, Clean Catch [[id number]]  (Abnormal) Collected: 11/09/23 1855    Specimen: Urine, Clean Catch Updated: 11/09/23 1915     RBC, UA 21-50 /HPF      WBC, UA Too Numerous to Count /HPF      Bacteria, UA 4+ /HPF      Squamous Epithelial Cells, UA 3-6 /HPF      Hyaline Casts, UA 0-2 /LPF      Methodology Automated Microscopy    Urine Culture - Urine, Urine, Clean Catch [[id number]] Collected: 11/09/23 1855    Specimen: Urine, Clean Catch Updated: 11/09/23 1921    Comprehensive Metabolic Panel [[id number]]  (Abnormal) Collected: 11/09/23 1944    Specimen: Blood Updated: 11/09/23 2033     Glucose 93 mg/dL      BUN 45 mg/dL      Creatinine 1.12 mg/dL      Sodium 142 mmol/L      Potassium 2.8 mmol/L      Chloride 106 mmol/L      CO2 24.0 mmol/L      Calcium 9.6 mg/dL      Total Protein 7.1 g/dL      Albumin 3.3 g/dL      ALT (SGPT) 56 U/L      AST (SGOT) 82 U/L      Alkaline Phosphatase 166 U/L      Total Bilirubin 0.5 mg/dL      Globulin 3.8 gm/dL      A/G Ratio 0.9 g/dL      BUN/Creatinine Ratio 40.2     Anion Gap 12.0 mmol/L      eGFR 50.4 mL/min/1.73     Narrative:      GFR Normal >60  Chronic  Kidney Disease <60  Kidney Failure <15    The GFR formula is only valid for adults with stable renal function between ages 18 and 70.    CK [[id number]]  (Normal) Collected: 11/09/23 1944    Specimen: Blood Updated: 11/09/23 2033     Creatine Kinase 95 U/L     Lactic Acid, Plasma [[id number]]  (Normal) Collected: 11/09/23 1944    Specimen: Blood Updated: 11/09/23 2020     Lactate 1.3 mmol/L     Blood Culture - Blood, Arm, Right [[id number]] Collected: 11/09/23 1944    Specimen: Blood from Arm, Right Updated: 11/09/23 1950    Magnesium [[id number]]  (Normal) Collected: 11/09/23 1944    Specimen: Blood Updated: 11/09/23 2055     Magnesium 1.9 mg/dL     Blood Culture - Blood, Arm, Left [[id number]] Collected: 11/1945    Specimen: Blood from Arm, Left Updated: 11/09/23 2009            No results found for this or any previous visit.    Imaging Results (Last 24 Hours)       Procedure Component Value Units Date/Time    CT Head Without Contrast [[id number]] Collected: 11/09/23 1853     Updated: 11/09/23 1916    Narrative:      CT HEAD AND CERVICAL SPINE WITHOUT CONTRAST     CLINICAL HISTORY: [Fall]     TECHNIQUE: CT scan of the head and cervical spine was obtained with  axial soft tissue and bone algorithm images. No intravenous contrast was  administered. Sagittal and coronal reconstructions were obtained.     COMPARISON: [None available]     FINDINGS:  No intracranial hemorrhage.  No midline shift or mass effect.   No CT evidence of acute territorial infarction.  No extraaxial  collection.  No hydrocephalus.  Clear visualized portions of the  paranasal sinuses and mastoid air cells.     Straightening of the normal cervical lordosis with minimal C3 on C4  anterolisthesis and minimal C5 on C6 retrolisthesis. Multilevel disc  degeneration, moderate at C4-C7. Multilevel facet arthropathy, moderate  bilaterally at C2-C6. Likely mild spinal canal stenosis at C4-C7  secondary to posterior disc osteophyte complexes. No  prevertebral soft  tissue swelling.          Impression:      1. No acute intracranial abnormality.  2. No cervical spine fracture or traumatic subluxation. Degenerative  changes as above.              Radiation dose reduction techniques were utilized, including automated  exposure control and exposure modulation based on body size.     This report was finalized on 11/9/2023 7:13 PM by Dr. Rashad Quintero M.D on Workstation: [id number]       CT Cervical Spine Without Contrast [[id number]] Collected: 11/09/23 1853     Updated: 11/09/23 1916    Narrative:      CT HEAD AND CERVICAL SPINE WITHOUT CONTRAST     CLINICAL HISTORY: [Fall]     TECHNIQUE: CT scan of the head and cervical spine was obtained with  axial soft tissue and bone algorithm images. No intravenous contrast was  administered. Sagittal and coronal reconstructions were obtained.     COMPARISON: [None available]     FINDINGS:  No intracranial hemorrhage.  No midline shift or mass effect.   No CT evidence of acute territorial infarction.  No extraaxial  collection.  No hydrocephalus.  Clear visualized portions of the  paranasal sinuses and mastoid air cells.     Straightening of the normal cervical lordosis with minimal C3 on C4  anterolisthesis and minimal C5 on C6 retrolisthesis. Multilevel disc  degeneration, moderate at C4-C7. Multilevel facet arthropathy, moderate  bilaterally at C2-C6. Likely mild spinal canal stenosis at C4-C7  secondary to posterior disc osteophyte complexes. No prevertebral soft  tissue swelling.          Impression:      1. No acute intracranial abnormality.  2. No cervical spine fracture or traumatic subluxation. Degenerative  changes as above.              Radiation dose reduction techniques were utilized, including automated  exposure control and exposure modulation based on body size.     This report was finalized on 11/9/2023 7:13 PM by Dr. Rashad Quintero M.D on Workstation: [id number]       XR Chest 1 View [[id number]]  Collected: 11/09/23 1908     Updated: 11/09/23 1912    Narrative:      Portable chest radiograph     HISTORY: Weakness     TECHNIQUE: Single AP portable radiograph of the chest     COMPARISON: None       Impression:      FINDINGS AND IMPRESSION:  Minimal atelectasis and or pleural parenchymal scarring is present  within the bilateral lung bases. No pneumothorax or pleural effusion is  seen. Cardiac silhouette is within normal its for size. Mild asymmetric  elevation of the right ami diaphragm.     This report was finalized on 11/9/2023 7:09 PM by Dr. Davis Sanchez M.D  on Workstation: [id number]                   No orders to display              Assessment/Plan     Active Hospital Problems    Diagnosis  POA    **Acute UTI [N39.0]  Yes      Resolved Hospital Problems   No resolved problems to display.     Acute urinary tract infection  Started on ceftriaxone, will continue.  Gentle fluids  Follow-up blood and urine cultures    Generalized weakness  Fall  PT/OT evaluation management    Hypokalemia  Replace as needed  Check magnesium    Transaminitis  AST and ALT are 82 and 56 respectively.  Check hepatitis panel, ethanol level        I discussed the patient's findings and my recommendations with patient and ED provider.    VTE Prophylaxis - SCDs.  Code Status - Full code.       Tacos Arreaga MD  [facility]  11/09/23  22:32 EST

## 2023-11-10 NOTE — THERAPY EVALUATION
Patient Name: Sammi Curtis  : 1945    MRN: [id number]                              Today's Date: 11/10/2023       Admit Date: 2023    Visit Dx:     ICD-10-CM ICD-9-CM   1. Generalized weakness  R53.1 780.79   2. Urinary tract infection without hematuria, site unspecified  N39.0 599.0   3. Hypokalemia  E87.6 276.8     Patient Active Problem List   Diagnosis    Acute UTI     Past Medical History:   Diagnosis Date    Arthritis     Disease of thyroid gland     Elevated cholesterol      History reviewed. No pertinent surgical history.   General Information       Row Name 11/10/23 1629          Physical Therapy Time and Intention    Document Type evaluation  -     Mode of Treatment individual therapy;physical therapy  -       Row Name 11/10/23 162          Living Environment    People in Home alone  -       Row Name 11/10/23 162          Cognition    Orientation Status (Cognition) oriented x 4  -       Row Name 11/10/23 1629          Safety Issues, Functional Mobility    Impairments Affecting Function (Mobility) balance;endurance/activity tolerance;strength  -               User Key  (r) = Recorded By, (t) = Taken By, (c) = Cosigned By      Initials Name Provider Type     Paty Nowak, PT Physical Therapist                   Mobility       Row Name 11/10/23 1631          Bed Mobility    Supine-Sit Gore (Bed Mobility) minimum assist (75% patient effort)  -     Sit-Supine Gore (Bed Mobility) minimum assist (75% patient effort)  -       Row Name 11/10/23 1631          Sit-Stand Transfer    Sit-Stand Gore (Transfers) contact guard  -     Assistive Device (Sit-Stand Transfers) walker, front-wheeled  -       Row Name 11/10/23 1631          Gait/Stairs (Locomotion)    Gore Level (Gait) contact guard  -     Assistive Device (Gait) walker, front-wheeled  -     Distance in Feet (Gait) 80 ft  -     Deviations/Abnormal Patterns (Gait) stride length  decreased;gait speed decreased  -     Bilateral Gait Deviations forward flexed posture  -     Comment, (Gait/Stairs) slow and cautious gait but pt and family report this is baseline  -               User Key  (r) = Recorded By, (t) = Taken By, (c) = Cosigned By      Initials Name Provider Type    Paty Abrams, PT Physical Therapist                   Obj/Interventions       Row Name 11/10/23 1633          Range of Motion Comprehensive    Comment, General Range of Motion BLE WFL  -       Row Name 11/10/23 1633          Strength Comprehensive (MMT)    Comment, General Manual Muscle Testing (MMT) Assessment [facility]  -       Row Name 11/10/23 1633          Motor Skills    Therapeutic Exercise --  BLE AP, LAQ, seated marching x 10 reps  -               User Key  (r) = Recorded By, (t) = Taken By, (c) = Cosigned By      Initials Name Provider Type    Paty Abrams, PT Physical Therapist                   Goals/Plan       Row Name 11/10/23 1642          Bed Mobility Goal 1 (PT)    Activity/Assistive Device (Bed Mobility Goal 1, PT) bed mobility activities, all  -KH     Pleasants Level/Cues Needed (Bed Mobility Goal 1, PT) standby assist  -KH     Time Frame (Bed Mobility Goal 1, PT) 1 week  -       Row Name 11/10/23 1642          Transfer Goal 1 (PT)    Activity/Assistive Device (Transfer Goal 1, PT) transfers, all;walker, rolling  -KH     [place]as[place] Level/Cues Needed (Transfer Goal 1, PT) standby assist  -KH     Time Frame (Transfer Goal 1, PT) 1 week  -       Row Name 11/10/23 1642          Gait Training Goal 1 (PT)    Activity/Assistive Device (Gait Training Goal 1, PT) gait (walking locomotion);walker, rolling  -KH     Pleasants Level (Gait Training Goal 1, PT) standby assist  -KH     Distance (Gait Training Goal 1, PT) 150 ft  -KH     Time Frame (Gait Training Goal 1, PT) 1 week  -       Row Name 11/10/23 1642          Patient Education Goal (PT)    Activity (Patient  Education Goal, PT) HEP  -     [place]/Cues/Accuracy (Memory Goal 2, PT) demonstrates adequately  -     Time Frame (Patient Education Goal, PT) 1 week  -       Row Name 11/10/23 1642          Therapy Assessment/Plan (PT)    Planned Therapy Interventions (PT) gait training;home exercise program;strengthening;transfer training;patient/family education  -               User Key  (r) = Recorded By, (t) = Taken By, (c) = Cosigned By      Initials Name Provider Type     Paty Nowak, PT Physical Therapist                   Clinical Impression       Row Name 11/10/23 1634          Pain    Pretreatment Pain Rating 1/10  -     Posttreatment Pain Rating 1/10  -     Pain Location - Side/Orientation Bilateral  -     Pain Location - knee  -       Row Name 11/10/23 1634          Plan of Care Review    Plan of Care Reviewed With patient  -     Outcome Evaluation Pt admittedafter a fall at home. Pt was unable to get up and spent hours on the floor. At baseline, pt was independently mobile with 3 wheeled walker at home. She lives in a one story home with no stairs. Pt hopes to d/c back home with family to assist as needed. Today, pt was agreeable to therapy. She complained of soreness in both knee, but ambulated well with Rwx. Pt was forward flexed and gait was slow and cautious. Pt and family reports gait was near baseline. Endurance appears to be lower than baseline. Pt would benefit from PT to address strengthening and endurance while admitted and possibly with  PT. Appears safe to d/c home with family to assist as needed. Unsure if she wants  PT.  -       Row Name 11/10/23 8395          Therapy Assessment/Plan (PT)    Patient/Family Therapy Goals Statement (PT) return home to [facility]  -     Rehab Potential (PT) good, to achieve stated therapy goals  -     Criteria for Skilled Interventions Met (PT) yes  -     Therapy Frequency (PT) 6 times/wk  -       Row Name 11/10/23 1722           Positioning and Restraints    Pre-Treatment Position in bed  no alarm  -KH     Post Treatment Position bed  -KH     In Bed fowlers;call light within reach;encouraged to call for assist  -SAVANNAH               User Key  (r) = Recorded By, (t) = Taken By, (c) = Cosigned By      Initials Name Provider Type    Paty Abrams, PT Physical Therapist                   Outcome Measures       Row Name 11/10/23 1643 11/10/23 0830       How much help from another person do you currently need...    Turning from your back to your side while in flat bed without using bedrails? 3  -KH 3  -JK    Moving from lying on back to sitting on the side of a flat bed without bedrails? 3  -KH 3  -JK    Moving to and from a bed to a chair (including a wheelchair)? 3  -KH 2  -JK    Standing up from a chair using your arms (e.g., wheelchair, bedside chair)? 3  -KH 2  -JK    Climbing 3-5 steps with a railing? 2  -KH 2  -JK    To walk in hospital room? 3  -KH 2  -JK    AM-PAC 6 Clicks Score (PT) 17  -KH 14  -JK    Highest level of mobility 5 --> Static standing  -KH 4 --> Transferred to chair/commode  -JK      Row Name 11/10/23 1643 11/10/23 0959       Functional Assessment    Outcome Measure Options AM-PAC 6 Clicks Basic Mobility (PT)  -KH AM-PAC 6 Clicks Daily Activity (OT)  -JAMILA              User Key  (r) = Recorded By, (t) = Taken By, (c) = Cosigned By      Initials Name Provider Type    Paty Abrams, PT Physical Therapist    Ana Young, RN Registered Nurse    Genevieve Bland, OT Occupational Therapist                                 Physical Therapy Education       Title: PT OT SLP Therapies (In Progress)       Topic: Physical Therapy (Not Started)       Point: Mobility training (Not Started)       Learner Progress:  Not documented in this visit.              Point: Home exercise program (Not Started)       Learner Progress:  Not documented in this visit.              Point: Body mechanics (Not Started)        Learner Progress:  Not documented in this visit.              Point: Precautions (Not Started)       Learner Progress:  Not documented in this visit.                                  PT Recommendation and Plan  Planned Therapy Interventions (PT): gait training, home exercise program, strengthening, transfer training, patient/family education  Plan of Care Reviewed With: patient  Outcome Evaluation: Pt admittedafter a fall at home. Pt was unable to get up and spent hours on the floor. At baseline, pt was independently mobile with 3 wheeled walker at home. She lives in a one story home with no stairs. Pt hopes to d/c back home with family to assist as needed. Today, pt was agreeable to therapy. She complained of soreness in both knee, but ambulated well with Rwx. Pt was forward flexed and gait was slow and cautious. Pt and family reports gait was near baseline. Endurance appears to be lower than baseline. Pt would benefit from PT to address strengthening and endurance while admitted and possibly with HH PT. Appears safe to d/c home with family to assist as needed. Unsure if she wants  PT.     Time Calculation:         PT Charges       Row Name 11/10/23 1643             Time Calculation    Start Time 1530  -      Stop Time 1552  -      Time Calculation (min) 22 min  -KH      PT Received On 11/10/23  -      PT - Next Appointment 11/11/23  -      PT Goal Re-Cert Due Date 11/17/23  -         Time Calculation- PT    Total Timed Code Minutes- PT 14 minute(s)  -         Timed Charges    12299 - PT Therapeutic Exercise Minutes 6  -KH      69034 - PT Therapeutic Activity Minutes 9  -KH         Untimed Charges    PT Eval/Re-eval Minutes 8  -KH         Total Minutes    Timed Charges Total Minutes 15  -KH      Untimed Charges Total Minutes 8  -KH       Total Minutes 23  -KH                User Key  (r) = Recorded By, (t) = Taken By, (c) = Cosigned By      Initials Name Provider Type    Paty Abrams  Ailyn, PT Physical Therapist                  Therapy Charges for Today       Code Description Service Date Service Provider Modifiers Qty    69626226254 HC PT THERAPEUTIC ACT EA 15 MIN 11/10/2023 Paty Nowak, PT GP 1    47078277549 HC PT EVAL MOD COMPLEXITY 2 11/10/2023 Paty Nowak, PT GP 1            PT G-Codes  Outcome Measure Options: AM-PAC 6 Clicks Basic Mobility (PT)  AM-PAC 6 Clicks Score (PT): 17  AM-PAC 6 Clicks Score (OT): 14  PT Discharge Summary  Anticipated Discharge Disposition (PT): home with assist, home with home health    Paty Nowak, PT  11/10/2023

## 2023-11-10 NOTE — PLAN OF CARE
"Goal Outcome Evaluation:  Plan of Care Reviewed With: patient           Outcome Evaluation: Pt seen for OT derick this AM. Admitted after she was found down at home by family. Pt reports she was walking and \"below my knees gave out\". She states she lives alone and is independent with ADLs and uses a three wheeled walker for mobility. Today she performed bed mobility with min A and stood with min A and use of rwx to ambulate to the sink with CGA/min A. She was able to stand for grooming task but noticed to fatigue by the end of activity. She was dep for LBD and max for toileting at this time. Encourage pt to use BSC/bathroom as able to improve overall mobility and strength. Pt presents with decreased strength, endurance, activity tolerance, ADL performance and functional mobility. Pt to benefit from skilled OT to address deficits.      Anticipated Discharge Disposition (OT): skilled nursing facility, home with home health, home with assist (pending on progress in acute)  "

## 2023-11-10 NOTE — PAYOR COMM NOTE
"Sammi Curtis (78 y.o. Female)      REQUESTING OBSERVATION AUTHORIZATION:  ID#  [id number]     DEPT: [phone],  [phone]    [facility]: NPI [id number]  [facility]# [id number]    SWTEHA MELCHOR RN,CCP     Date of Birth   1945    Social Security Number       Address   [address] [place] [address]    Home Phone   [phone]    MRN   [id number]       [facility]    Marital Status                               Admission Date   11/9/23    Admission Type   Emergency    Admitting Provider   Tacos Arreaga MD    Attending Provider   Tacos Arreaga MD    Department, Room/Bed   [address], [address]/1       Discharge Date       Discharge Disposition       Discharge Destination                                 Attending Provider: Tacos Arreaga MD    Allergies: No Known Allergies    Isolation: None   Infection: None   Code Status: CPR    Ht: 157.5 cm (62\")   Wt: 73.5 kg (162 lb)    Admission Cmt: None   Principal Problem: Acute UTI [N39.0]                   Active Insurance as of 11/9/2023       Primary Coverage       Payor Plan Insurance Group Employer/Plan Group    [facility]IC[facility]        Payor Plan Address Payor Plan Phone Number Payor Plan Fax Number Effective Dates    [address] [address] [phone]  11/9/2023 - None Entered    [facility] [address]         Subscriber Name Subscriber Birth Date Member ID       SAMMI CURTIS 1945 [id number]                     Emergency Contacts        (Rel.) Home Phone Work Phone Mobile Phone    LUCIANA DEL CID (Daughter) -- -- [phone]                 History & Physical        Tacos Arreaga MD at 11/09/23 2232              Patient Name:  Sammi Curtis  YOB: 1945  MRN:  [id number]  Admit Date:  11/9/2023  Patient Care Team:  Dary Mcbride APRN as PCP - General (Family Medicine)      Subjective  History Present " Illness     Chief Complaint   Patient presents with    Fall     This is a 78-year-old woman with apparently no significant medical history who presents to the hospital after experiencing a second UTI in 1 month, as well as a fall.  She was walking in her room on the night prior to arrival, she felt that she was too weak to stand and then she fell.  She hit her head without any loss of consciousness.  Because she was too weak to get up she laid on the ground all night.  Family found her following and brought to the hospital for further management  In the emergency department, work-up included basic metabolic panel which revealed a potassium level of 2.8, creatinine of 1.12, white blood cell count elevation of 20.12.  Urinalysis was revealing of a urinary tract infection.  Blood cultures and urine cultures were obtained.  CT of the head and cervical spine did not reveal any acute abnormalities.      Personal History     No past medical history on file.  No past surgical history on file.  No family history on file.     No current facility-administered medications on file prior to encounter.     No current outpatient medications on file prior to encounter.     No Known Allergies    Objective   Objective     Vital Signs  Temp:  [98.2 °F (36.8 °C)] 98.2 °F (36.8 °C)  Heart Rate:  [68-82] 78  Resp:  [18] 18  BP: (128-158)/(60-89) 158/79  SpO2:  [94 %-97 %] 95 %  on   ;   Device (Oxygen Therapy): room air  Body mass index is 29.63 kg/m².    Physical Exam  Constitutional:       General: She is not in acute distress.  HENT:      Head: Normocephalic and atraumatic.   Eyes:      Extraocular Movements: Extraocular movements intact.      Pupils: Pupils are equal, round, and reactive to light.   Cardiovascular:      Rate and Rhythm: Normal rate and regular rhythm.   Pulmonary:      Effort: Pulmonary effort is normal. No respiratory distress.   Abdominal:      General: There is no distension.      Palpations: Abdomen is soft.       Tenderness: There is no abdominal tenderness.   Musculoskeletal:      Right lower leg: No edema.      Left lower leg: No edema.   Skin:     General: Skin is warm and dry.   Neurological:      General: No focal deficit present.      Mental Status: She is alert and oriented to person, place, and time.      Motor: Weakness present.         Results Review:  I reviewed the patient's new clinical results.  I reviewed the patient's new imaging results and agree with the interpretation.  I reviewed the patient's other test results and agree with the interpretation  I personally viewed and interpreted the patient's EKG/Telemetry data  Discussed with ED provider.    Lab Results (last 24 hours)       Procedure Component Value Units Date/Time    CBC & Differential [[id number]]  (Abnormal) Collected: 11/09/23 1752    Specimen: Blood Updated: 11/09/23 1802    Narrative:      The following orders were created for panel order CBC & Differential.  Procedure                               Abnormality         Status                     ---------                               -----------         ------                     CBC Auto Differential[[id number]]        Abnormal            Final result                 Please view results for these tests on the individual orders.    CBC Auto Differential [[id number]]  (Abnormal) Collected: 11/09/23 1752    Specimen: Blood Updated: 11/09/23 1802     WBC 20.12 10*3/mm3      RBC 3.98 10*6/mm3      Hemoglobin 11.9 g/dL      Hematocrit 35.2 %      MCV 88.4 fL      MCH 29.9 pg      MCHC 33.8 g/dL      RDW 12.5 %      RDW-SD 40.8 fl      MPV 11.2 fL      Platelets 341 10*3/mm3      Neutrophil % 77.3 %      Lymphocyte % 7.7 %      Monocyte % 14.1 %      Eosinophil % 0.0 %      Basophil % 0.1 %      Immature Grans % 0.8 %      Neutrophils, Absolute 15.55 10*3/mm3      Lymphocytes, Absolute 1.55 10*3/mm3      Monocytes, Absolute 2.83 10*3/mm3      Eosinophils, Absolute 0.00 10*3/mm3      Basophils, Absolute  0.03 10*3/mm3      Immature Grans, Absolute 0.16 10*3/mm3      nRBC 0.0 /100 WBC     Urinalysis With Microscopic If Indicated (No Culture) - Urine, Clean Catch [[id number]]  (Abnormal) Collected: 11/09/23 1855    Specimen: Urine, Clean Catch Updated: 11/09/23 1913     Color, UA Yellow     Appearance, UA Turbid     pH, UA 6.0     Specific Gravity, UA 1.019     Glucose, UA Negative     Ketones, UA 15 mg/dL (1+)     Bilirubin, UA Negative     Blood, UA Moderate (2+)     Protein,  mg/dL (2+)     Leuk Esterase, UA Large (3+)     Nitrite, UA Positive     Urobilinogen, UA 1.0 E.U./dL    Urinalysis, Microscopic Only - Urine, Clean Catch [[id number]]  (Abnormal) Collected: 11/09/23 1855    Specimen: Urine, Clean Catch Updated: 11/09/23 1915     RBC, UA 21-50 /HPF      WBC, UA Too Numerous to Count /HPF      Bacteria, UA 4+ /HPF      Squamous Epithelial Cells, UA 3-6 /HPF      Hyaline Casts, UA 0-2 /LPF      Methodology Automated Microscopy    Urine Culture - Urine, Urine, Clean Catch [[id number]] Collected: 11/09/23 1855    Specimen: Urine, Clean Catch Updated: 11/09/23 1921    Comprehensive Metabolic Panel [[id number]]  (Abnormal) Collected: 11/09/23 1944    Specimen: Blood Updated: 11/09/23 2033     Glucose 93 mg/dL      BUN 45 mg/dL      Creatinine 1.12 mg/dL      Sodium 142 mmol/L      Potassium 2.8 mmol/L      Chloride 106 mmol/L      CO2 24.0 mmol/L      Calcium 9.6 mg/dL      Total Protein 7.1 g/dL      Albumin 3.3 g/dL      ALT (SGPT) 56 U/L      AST (SGOT) 82 U/L      Alkaline Phosphatase 166 U/L      Total Bilirubin 0.5 mg/dL      Globulin 3.8 gm/dL      A/G Ratio 0.9 g/dL      BUN/Creatinine Ratio 40.2     Anion Gap 12.0 mmol/L      eGFR 50.4 mL/min/1.73     Narrative:      GFR Normal >60  Chronic Kidney Disease <60  Kidney Failure <15    The GFR formula is only valid for adults with stable renal function between ages 18 and 70.    CK [[id number]]  (Normal) Collected: 11/09/23 1944    Specimen: Blood Updated:  11/09/23 2033     Creatine Kinase 95 U/L     Lactic Acid, Plasma [[id number]]  (Normal) Collected: 11/09/23 1944    Specimen: Blood Updated: 11/09/23 2020     Lactate 1.3 mmol/L     Blood Culture - Blood, Arm, Right [[id number]] Collected: 11/09/23 1944    Specimen: Blood from Arm, Right Updated: 11/09/23 1950    Magnesium [[id number]]  (Normal) Collected: 11/09/23 1944    Specimen: Blood Updated: 11/09/23 2055     Magnesium 1.9 mg/dL     Blood Culture - Blood, Arm, Left [[id number]] Collected: 11/1945    Specimen: Blood from Arm, Left Updated: 11/09/23 2009            No results found for this or any previous visit.    Imaging Results (Last 24 Hours)       Procedure Component Value Units Date/Time    CT Head Without Contrast [[id number]] Collected: 11/09/23 1853     Updated: 11/09/23 1916    Narrative:      CT HEAD AND CERVICAL SPINE WITHOUT CONTRAST     CLINICAL HISTORY: [Fall]     TECHNIQUE: CT scan of the head and cervical spine was obtained with  axial soft tissue and bone algorithm images. No intravenous contrast was  administered. Sagittal and coronal reconstructions were obtained.     COMPARISON: [None available]     FINDINGS:  No intracranial hemorrhage.  No midline shift or mass effect.   No CT evidence of acute territorial infarction.  No extraaxial  collection.  No hydrocephalus.  Clear visualized portions of the  paranasal sinuses and mastoid air cells.     Straightening of the normal cervical lordosis with minimal C3 on C4  anterolisthesis and minimal C5 on C6 retrolisthesis. Multilevel disc  degeneration, moderate at C4-C7. Multilevel facet arthropathy, moderate  bilaterally at C2-C6. Likely mild spinal canal stenosis at C4-C7  secondary to posterior disc osteophyte complexes. No prevertebral soft  tissue swelling.          Impression:      1. No acute intracranial abnormality.  2. No cervical spine fracture or traumatic subluxation. Degenerative  changes as above.              Radiation dose  reduction techniques were utilized, including automated  exposure control and exposure modulation based on body size.     This report was finalized on 11/9/2023 7:13 PM by Dr. Rashad Quintero M.D on Workstation: [id number]       CT Cervical Spine Without Contrast [[id number]] Collected: 11/09/23 1853     Updated: 11/09/23 1916    Narrative:      CT HEAD AND CERVICAL SPINE WITHOUT CONTRAST     CLINICAL HISTORY: [Fall]     TECHNIQUE: CT scan of the head and cervical spine was obtained with  axial soft tissue and bone algorithm images. No intravenous contrast was  administered. Sagittal and coronal reconstructions were obtained.     COMPARISON: [None available]     FINDINGS:  No intracranial hemorrhage.  No midline shift or mass effect.   No CT evidence of acute territorial infarction.  No extraaxial  collection.  No hydrocephalus.  Clear visualized portions of the  paranasal sinuses and mastoid air cells.     Straightening of the normal cervical lordosis with minimal C3 on C4  anterolisthesis and minimal C5 on C6 retrolisthesis. Multilevel disc  degeneration, moderate at C4-C7. Multilevel facet arthropathy, moderate  bilaterally at C2-C6. Likely mild spinal canal stenosis at C4-C7  secondary to posterior disc osteophyte complexes. No prevertebral soft  tissue swelling.          Impression:      1. No acute intracranial abnormality.  2. No cervical spine fracture or traumatic subluxation. Degenerative  changes as above.              Radiation dose reduction techniques were utilized, including automated  exposure control and exposure modulation based on body size.     This report was finalized on 11/9/2023 7:13 PM by Dr. Rashad Quintero M.D on Workstation: [id number]       XR Chest 1 View [[id number]] Collected: 11/09/23 1908     Updated: 11/09/23 1912    Narrative:      Portable chest radiograph     HISTORY: Weakness     TECHNIQUE: Single AP portable radiograph of the chest     COMPARISON: None       Impression:       FINDINGS AND IMPRESSION:  Minimal atelectasis and or pleural parenchymal scarring is present  within the bilateral lung bases. No pneumothorax or pleural effusion is  seen. Cardiac silhouette is within normal its for size. Mild asymmetric  elevation of the right ami diaphragm.     This report was finalized on 11/9/2023 7:09 PM by Dr. Davis Sanchez M.D  on Workstation: [id number]                   No orders to display              Assessment/Plan     Active Hospital Problems    Diagnosis  POA    **Acute UTI [N39.0]  Yes      Resolved Hospital Problems   No resolved problems to display.     Acute urinary tract infection  Started on ceftriaxone, will continue.  Gentle fluids  Follow-up blood and urine cultures    Generalized weakness  Fall  PT/OT evaluation management    Hypokalemia  Replace as needed  Check magnesium    Transaminitis  AST and ALT are 82 and 56 respectively.  Check hepatitis panel, ethanol level        I discussed the patient's findings and my recommendations with patient and ED provider.    VTE Prophylaxis - SCDs.  Code Status - Full code.       Tacos Arreaga MD  [facility]  11/09/23  22:32 EST      Electronically signed by Tacos Arreaga MD at 11/09/23 2238          Emergency Department Notes        Boyd Hough MD at 11/09/23 1727           EMERGENCY DEPARTMENT ENCOUNTER    Room Number:  30/30  PCP: Dary Mcbride APRN  Patient Care Team:  Dary Mcbride APRN as PCP - General (Family Medicine)   Independent Historians: Patient, family, EMS    HPI:  Chief Complaint: Fall    A complete HPI/ROS/PMH/PSH/SH/FH are unobtainable due to: Nothing    Chronic or social conditions impacting patient care (Social Determinants of Health): None  (Financial Resource Strain / Food Insecurity / Transportation Needs / Physical Activity / Stress / Social Connections / Intimate Partner Violence / Housing Stability)    Context: Sammi Curtis is a 78 y.o. female who presents to the ED  c/o acute fall.  The patient reports that she was walking in her room last night and she got too weak to stand and she fell.  She reports she struck her head.  She reports no loss of consciousness.  She reports pain to her neck.  She states she was too weak to get up so she laid on the ground all night.  She denies prior similar episodes.  She denies any other injury.  Family found her today and came here.  She denies any recent illness.  She has no chest pain or shortness of breath.        Prescription drug monitoring program review:         PAST MEDICAL HISTORY  Active Ambulatory Problems     Diagnosis Date Noted    No Active Ambulatory Problems     Resolved Ambulatory Problems     Diagnosis Date Noted    No Resolved Ambulatory Problems     No Additional Past Medical History         PAST SURGICAL HISTORY  No past surgical history on file.      FAMILY HISTORY  No family history on file.      SOCIAL HISTORY  Social History     Socioeconomic History    Marital status:          ALLERGIES  Patient has no known allergies.        REVIEW OF SYSTEMS  Review of Systems  Included in HPI  All systems reviewed and negative except for those discussed in HPI.      PHYSICAL EXAM    I have reviewed the triage vital signs and nursing notes.    ED Triage Vitals [11/09/23 1652]   Temp Heart Rate Resp BP SpO2   98.2 °F (36.8 °C) 76 18 138/60 94 %      Temp src Heart Rate Source Patient Position BP Location FiO2 (%)   Oral Monitor Sitting Left arm --       Physical Exam  GENERAL: Awake, alert, no acute distress smells strongly of urine.  SKIN: Warm, dry  HENT: Normocephalic, atraumatic  EYES: no scleral icterus  CV: regular rhythm, regular rate  RESPIRATORY: normal effort, lungs clear  ABDOMEN: soft, nontender, nondistended  MUSCULOSKELETAL: no deformity, equal lower extremity strength and sensation.  No thoracic or lumbar spine tenderness.  She has mild diffuse cervical spine tenderness.  No tenderness to the hips, knees,  elbows or shoulders.  NEURO: alert, moves all extremities, follows commands                                                               LAB RESULTS  Recent Results (from the past 24 hour(s))   CBC Auto Differential    Collection Time: 11/09/23  5:52 PM    Specimen: Blood   Result Value Ref Range    WBC 20.12 (H) 3.40 - 10.80 10*3/mm3    RBC 3.98 3.77 - 5.28 10*6/mm3    Hemoglobin 11.9 (L) 12.0 - 15.9 g/dL    Hematocrit 35.2 34.0 - 46.6 %    MCV 88.4 79.0 - 97.0 fL    MCH 29.9 26.6 - 33.0 pg    MCHC 33.8 31.5 - 35.7 g/dL    RDW 12.5 12.3 - 15.4 %    RDW-SD 40.8 37.0 - 54.0 fl    MPV 11.2 6.0 - 12.0 fL    Platelets 341 140 - 450 10*3/mm3    Neutrophil % 77.3 (H) 42.7 - 76.0 %    Lymphocyte % 7.7 (L) 19.6 - 45.3 %    Monocyte % 14.1 (H) 5.0 - 12.0 %    Eosinophil % 0.0 (L) 0.3 - 6.2 %    Basophil % 0.1 0.0 - 1.5 %    Immature Grans % 0.8 (H) 0.0 - 0.5 %    Neutrophils, Absolute 15.55 (H) 1.70 - 7.00 10*3/mm3    Lymphocytes, Absolute 1.55 0.70 - 3.10 10*3/mm3    Monocytes, Absolute 2.83 (H) 0.10 - 0.90 10*3/mm3    Eosinophils, Absolute 0.00 0.00 - 0.40 10*3/mm3    Basophils, Absolute 0.03 0.00 - 0.20 10*3/mm3    Immature Grans, Absolute 0.16 (H) 0.00 - 0.05 10*3/mm3    nRBC 0.0 0.0 - 0.2 /100 WBC   Urinalysis With Microscopic If Indicated (No Culture) - Urine, Clean Catch    Collection Time: 11/09/23  6:55 PM    Specimen: Urine, Clean Catch   Result Value Ref Range    Color, UA Yellow Yellow, Straw    Appearance, UA Turbid (A) Clear    pH, UA 6.0 5.0 - 8.0    Specific Gravity, UA 1.019 1.005 - 1.030    Glucose, UA Negative Negative    Ketones, UA 15 mg/dL (1+) (A) Negative    Bilirubin, UA Negative Negative    Blood, UA Moderate (2+) (A) Negative    Protein,  mg/dL (2+) (A) Negative    Leuk Esterase, UA Large (3+) (A) Negative    Nitrite, UA Positive (A) Negative    Urobilinogen, UA 1.0 E.U./dL 0.2 - 1.0 E.U./dL   Urinalysis, Microscopic Only - Urine, Clean Catch    Collection Time: 11/09/23  6:55 PM     Specimen: Urine, Clean Catch   Result Value Ref Range    RBC, UA 21-50 (A) None Seen, 0-2 /HPF    WBC, UA Too Numerous to Count (A) None Seen, 0-2 /HPF    Bacteria, UA 4+ (A) None Seen /HPF    Squamous Epithelial Cells, UA 3-6 (A) None Seen, 0-2 /HPF    Hyaline Casts, UA 0-2 None Seen /LPF    Methodology Automated Microscopy    Comprehensive Metabolic Panel    Collection Time: 11/09/23  7:44 PM    Specimen: Blood   Result Value Ref Range    Glucose 93 65 - 99 mg/dL    BUN 45 (H) 8 - 23 mg/dL    Creatinine 1.12 (H) 0.57 - 1.00 mg/dL    Sodium 142 136 - 145 mmol/L    Potassium 2.8 (L) 3.5 - 5.2 mmol/L    Chloride 106 98 - 107 mmol/L    CO2 24.0 22.0 - 29.0 mmol/L    Calcium 9.6 8.6 - 10.5 mg/dL    Total Protein 7.1 6.0 - 8.5 g/dL    Albumin 3.3 (L) 3.5 - 5.2 g/dL    ALT (SGPT) 56 (H) 1 - 33 U/L    AST (SGOT) 82 (H) 1 - 32 U/L    Alkaline Phosphatase 166 (H) 39 - 117 U/L    Total Bilirubin 0.5 0.0 - 1.2 mg/dL    Globulin 3.8 gm/dL    A/G Ratio 0.9 g/dL    BUN/Creatinine Ratio 40.2 (H) 7.0 - 25.0    Anion Gap 12.0 5.0 - 15.0 mmol/L    eGFR 50.4 (L) >60.0 mL/min/1.73   CK    Collection Time: 11/09/23  7:44 PM    Specimen: Blood   Result Value Ref Range    Creatine Kinase 95 20 - 180 U/L   Lactic Acid, Plasma    Collection Time: 11/09/23  7:44 PM    Specimen: Blood   Result Value Ref Range    Lactate 1.3 0.5 - 2.0 mmol/L       Ordered the above labs and independently reviewed the results.        RADIOLOGY  CT Head Without Contrast, CT Cervical Spine Without Contrast    Result Date: 11/9/2023  CT HEAD AND CERVICAL SPINE WITHOUT CONTRAST  CLINICAL HISTORY: [Fall]  TECHNIQUE: CT scan of the head and cervical spine was obtained with axial soft tissue and bone algorithm images. No intravenous contrast was administered. Sagittal and coronal reconstructions were obtained.  COMPARISON: [None available]  FINDINGS:  No intracranial hemorrhage.  No midline shift or mass effect.  No CT evidence of acute territorial infarction.  No  extraaxial collection.  No hydrocephalus.  Clear visualized portions of the paranasal sinuses and mastoid air cells.  Straightening of the normal cervical lordosis with minimal C3 on C4 anterolisthesis and minimal C5 on C6 retrolisthesis. Multilevel disc degeneration, moderate at C4-C7. Multilevel facet arthropathy, moderate bilaterally at C2-C6. Likely mild spinal canal stenosis at C4-C7 secondary to posterior disc osteophyte complexes. No prevertebral soft tissue swelling.       1. No acute intracranial abnormality. 2. No cervical spine fracture or traumatic subluxation. Degenerative changes as above.     Radiation dose reduction techniques were utilized, including automated exposure control and exposure modulation based on body size.  This report was finalized on 11/9/2023 7:13 PM by Dr. Rashad Quintero M.D on Workstation: [id number]      XR Chest 1 View    Result Date: 11/9/2023  Portable chest radiograph  HISTORY: Weakness  TECHNIQUE: Single AP portable radiograph of the chest  COMPARISON: None      FINDINGS AND IMPRESSION: Minimal atelectasis and or pleural parenchymal scarring is present within the bilateral lung bases. No pneumothorax or pleural effusion is seen. Cardiac silhouette is within normal its for size. Mild asymmetric elevation of the right ami diaphragm.  This report was finalized on 11/9/2023 7:09 PM by Dr. Davis Sanchez M.D on Workstation: [id number]       I ordered the above noted radiological studies. Reviewed by me and discussed with radiologist.  See dictation for official radiology interpretation.      PROCEDURES    Procedures      MEDICATIONS GIVEN IN ER    Medications   sodium chloride 0.9 % flush 10 mL (has no administration in time range)   potassium chloride (K-DUR,KLOR-CON) ER tablet 40 mEq (has no administration in time range)   sodium chloride 0.9 % bolus 500 mL (0 mL Intravenous Stopped 11/9/23 7681)   cefTRIAXone (ROCEPHIN) 1,000 mg in sodium chloride 0.9 % 100 mL IVPB-VTB (0 mg  Intravenous Stopped 11/9/23 2033)         ORDERS PLACED DURING THIS VISIT:  Orders Placed This Encounter   Procedures    Blood Culture - Blood,    Blood Culture - Blood,    Urine Culture - Urine,    CT Head Without Contrast    CT Cervical Spine Without Contrast    XR Chest 1 View    Comprehensive Metabolic Panel    Urinalysis With Microscopic If Indicated (No Culture) - Urine, Clean Catch    CK    CBC Auto Differential    Urinalysis, Microscopic Only - Urine, Clean Catch    Lactic Acid, Plasma    Magnesium    Monitor Blood Pressure    Pulse Oximetry, Continuous    [facility] (on-call MD unless specified) Details    Insert Peripheral IV    CBC & Differential         PROGRESS, DATA ANALYSIS, CONSULTS, AND MEDICAL DECISION MAKING    All labs have been independently interpreted by me.  All radiology studies have been reviewed by me and discussed with radiologist dictating the report.   EKG's independently viewed and interpreted by me.  Discussion below represents my analysis of pertinent findings related to patient's condition, differential diagnosis, treatment plan and final disposition.    Differential diagnosis includes but is not limited to rhabdomyolysis, UTI, metabolic derangement.    ED Course as of 11/09/23 2043   Thu Nov 09, 2023   1759 XR Chest 1 View  My independent interpretation of the chest x-ray is no dense consolidation [TR]   1925 The patient has a UTI and generalized weakness.  Starting antibiotics.  Plan admission for further treatment.  The patient and family are agreeable. [TR]   2042 Care transferred to Ean lewis PA-C pending hospitalist admission. [TR]      ED Course User Index  [TR] Boyd Hough MD                  AS OF 20:43 EST VITALS:    BP - 148/89  HR - 72  TEMP - 98.2 °F (36.8 °C) (Oral)  O2 SATS - 96%        DIAGNOSIS  Final diagnoses:   Generalized weakness   Urinary tract infection without hematuria, site unspecified         DISPOSITION  ED Disposition       ED Disposition   Decision to  Admit    Condition   --    Comment   --                  Note Disclaimer: At [facility], we believe that sharing information builds trust and better relationships. You are receiving this note because you recently visited [facility]. It is possible you will see health information before a provider has talked with you about it. This kind of information can be easy to misunderstand. To help you fully understand what it means for your health, we urge you to discuss this note with your provider.         Boyd Hough MD  11/09/23 2043      Electronically signed by Boyd Hough MD at 11/09/23 2043

## 2023-11-10 NOTE — CASE MANAGEMENT/SOCIAL WORK
Continued Stay Note  [facility]     Patient Name: Sammi Curtis  MRN: [id number]  Today's Date: 11/10/2023    Admit Date: 11/9/2023    Plan: Plan home .    ELISSA Conrad RN   Discharge Plan       Row Name 11/10/23 0913       Plan    Plan Plan home .    ELISSA Conrad RN    Patient/Family in Agreement with Plan yes    Plan Comments FACE SHEET VERIFIED/ GERARDO SIGNED.   Spoke with pt at bedside.  Pt's PCP is JAZMYN Prather.  Pt lives alone in a first floor apartment.  Pt is independent with ADLs.  Pt has a came, shower chair, rolling walker and a wheelchair for home use if needed.  Pt gets her prescriptions at [facility]  ( [address]  Pt denies any issues affording medications. Pt is not current with HH.  Pt has not been in SNF.  Pt denies any discharge needs.  Pt's daughter will assist pt at home if needed and will transport pt home. Plan home .   ELISSA Conrad RN                   Discharge Codes    No documentation.                 Expected Discharge Date and Time       Expected Discharge Date Expected Discharge Time    Nov 13, 2023               Yolande Conrad RN

## 2023-11-10 NOTE — THERAPY EVALUATION
Patient Name: Sammi Curtis  : 1945    MRN: [id number]                              Today's Date: 11/10/2023       Admit Date: 2023    Visit Dx:     ICD-10-CM ICD-9-CM   1. Generalized weakness  R53.1 780.79   2. Urinary tract infection without hematuria, site unspecified  N39.0 599.0   3. Hypokalemia  E87.6 276.8     Patient Active Problem List   Diagnosis    Acute UTI     Past Medical History:   Diagnosis Date    Arthritis     Disease of thyroid gland     Elevated cholesterol      History reviewed. No pertinent surgical history.   General Information       Row Name 11/10/23 0916          OT Time and Intention    Document Type evaluation  -     Mode of Treatment individual therapy;occupational therapy  -       Row Name 11/10/23 0916          General Information    Patient Profile Reviewed yes  -JAMILA     Prior Level of Function independent:  Reports she uses 3 wheeled walker  -     Existing Precautions/Restrictions fall  -       Row Name 11/10/23 0916          Living Environment    People in Home alone  -       Row Name 11/10/23 0916          Home Main Entrance    Number of Stairs, Main Entrance none  -       Row Name 11/10/23 0916          Stairs Within Home, Primary    Number of Stairs, Within Home, Primary none  -       Row Name 11/10/23 0916          Cognition    Orientation Status (Cognition) oriented x 4  -KA       Row Name 11/10/23 0916          Safety Issues, Functional Mobility    Impairments Affecting Function (Mobility) balance;endurance/activity tolerance;strength  -               User Key  (r) = Recorded By, (t) = Taken By, (c) = Cosigned By      Initials Name Provider Type    Genevieve Bland OT Occupational Therapist                     Mobility/ADL's       Row Name 11/10/23 0917          Bed Mobility    Bed Mobility supine-sit;sit-supine  -     Supine-Sit [place] (Bed Mobility) minimum assist (75% patient effort)  -JAMILA     Sit-Supine [place] (Bed Mobility)  minimum assist (75% patient effort)  -     Assistive Device (Bed Mobility) bed rails;head of bed elevated  -KA       Row Name 11/10/23 0917          Transfers    Transfers sit-stand transfer  -KA       Row Name 11/10/23 0917          Sit-Stand Transfer    Sit-Stand [place] (Transfers) minimum assist (75% patient effort);1 person assist;moderate assist (50% patient effort)  -     Assistive Device (Sit-Stand Transfers) walker, front-wheeled  -KA       Row Name 11/10/23 0917          Functional Mobility    Functional Mobility- Ind. Level 1 person;contact guard assist;minimum assist (75% patient effort)  -     Functional Mobility- Device walker, front-wheeled  -     Functional Mobility-Distance (Feet) --  to sink in room and back to bed  -KA       Row Name 11/10/23 0917          Activities of Daily Living    BADL Assessment/Intervention feeding;grooming;lower body dressing;toileting  -KA       Row Name 11/10/23 0917          Self-Feeding Assessment/Training    [place] Level (Feeding) feeding skills;set up  -     Position (Self-Feeding) sitting up in bed  -KA       Row Name 11/10/23 0917          Grooming Assessment/Training    [place] Level (Grooming) grooming skills;oral care regimen;wash face, hands;contact guard assist  -     Position (Grooming) supported standing  -     Comment, (Grooming) Fatigued towards the end while standing.  -KA       Row Name 11/10/23 0917          Lower Body Dressing Assessment/Training    [place] Level (Lower Body Dressing) lower body dressing skills;doff;don;socks;dependent (less than 25% patient effort)  -     Position (Lower Body Dressing) edge of bed sitting  -     Comment, (Lower Body Dressing) Reports her daughter assists with donning socks. At home she usually only wears her slip on house shoes  -[facility] Name 11/10/23 0917          Toileting Assessment/Training    [place] Level (Toileting) toileting skills;dependent (less than 25%  patient effort)  -JAMILA     Comment, (Toileting) On purewick upon arrival. Encouraged pt to use BSC/bathroom as able to increase overall mobility and performance with ADLs  -               User Key  (r) = Recorded By, (t) = Taken By, (c) = Cosigned By      Initials Name Provider Type    Genevieve Bland OT Occupational Therapist                   Obj/Interventions       Row Name 11/10/23 0919          Range of Motion Comprehensive    General Range of Motion bilateral upper extremity ROM WFL  -       Row Name 11/10/23 0919          Strength Comprehensive (MMT)    General Manual Muscle Testing (MMT) Assessment upper extremity strength deficits identified  -     Comment, General Manual Muscle Testing (MMT) Assessment BUE 3+/5  -       Row Name 11/10/23 0919          Balance    Balance Assessment sitting static balance;sitting dynamic balance;standing static balance;standing dynamic balance  -     Static Sitting Balance standby assist  -     Dynamic Sitting Balance contact guard  -JAMILA     Static Standing Balance contact guard  -     Dynamic Standing Balance contact guard;minimal assist  -     Position/Device Used, Standing Balance walker, front-wheeled  -     Balance Interventions sitting;standing;occupation based/functional task  -               User Key  (r) = Recorded By, (t) = Taken By, (c) = Cosigned By      Initials Name Provider Type    Genevieve Bland OT Occupational Therapist                   Goals/Plan       Row Name 11/10/23 0958          Transfer Goal 1 (OT)    Activity/Assistive Device (Transfer Goal 1, OT) transfers, all  -KA     [place] Level/Cues Needed (Transfer Goal 1, OT) standby assist  -JAMILA     Time Frame (Transfer Goal 1, OT) short term goal (STG);2 weeks  -JAMILA     Progress/Outcome (Transfer Goal 1, OT) goal ongoing  -       Row Name 11/10/23 0958          Bathing Goal 1 (OT)    Activity/Device (Bathing Goal 1, OT) bathing skills, all  -KA     [place] Level/Cues  "Needed (Bathing Goal 1, OT) standby assist  -KA     Time Frame (Bathing Goal 1, OT) short term goal (STG);2 weeks  -KA     Progress/Outcomes (Bathing Goal 1, OT) goal ongoing  -       Row Name 11/10/23 0958          Toileting Goal 1 (OT)    Activity/Device (Toileting Goal 1, OT) toileting skills, all  -KA     Time Frame (Toileting Goal 1, OT) short term goal (STG);2 weeks  -KA     Progress/Outcome (Toileting Goal 1, OT) goal ongoing  -       Row Name 11/10/23 0958          Grooming Goal 1 (OT)    Activity/Device (Grooming Goal 1, OT) grooming skills, all  -KA     [place] (Grooming Goal 1, OT) standby assist  -KA     Time Frame (Grooming Goal 1, OT) short term goal (STG);2 weeks  -KA     Progress/Outcome (Grooming Goal 1, OT) goal ongoing  -       Row Name 11/10/23 0958          Therapy Assessment/Plan (OT)    Planned Therapy Interventions (OT) activity tolerance training;BADL retraining;ROM/therapeutic exercise;strengthening exercise;transfer/mobility retraining;functional balance retraining;passive ROM/stretching;patient/caregiver education/training;occupation/activity based interventions  -               User Key  (r) = Recorded By, (t) = Taken By, (c) = Cosigned By      Initials Name Provider Type    Genevieve Bland, ROSAURA Occupational Therapist                   Clinical Impression       Row Name 11/10/23 0919          Pain Assessment    Pretreatment Pain Rating --  -     Posttreatment Pain Rating --  -     Pre/Posttreatment Pain Comment soreness \"all over\"  -     Pain Intervention(s) Ambulation/increased activity;Rest;Repositioned  -       Row Name 11/10/23 0919          Plan of Care Review    Plan of Care Reviewed With patient  -     Outcome Evaluation Pt seen for OT derick this AM. Admitted after she was found down at home by family. Pt reports she was walking and \"below my knees gave out\". She states she lives alone and is independent with ADLs and uses a three wheeled walker for " mobility. Today she performed bed mobility with min A and stood with min A and use of rwx to ambulate to the sink with CGA/min A. She was able to stand for grooming task but noticed to fatigue by the end of activity. She was dep for LBD and max for toileting at this time. Encourage pt to use BSC/bathroom as able to improve overall mobility and strength. Pt presents with decreased strength, endurance, activity tolerance, ADL performance and functional mobility. Pt to benefit from skilled OT to address deficits.  -       Row Name 11/10/23 0919          Therapy Assessment/Plan (OT)    Rehab Potential (OT) good, to achieve stated therapy goals  -     Criteria for Skilled Therapeutic Interventions Met (OT) yes  -     Therapy Frequency (OT) 5 times/wk  -       Row Name 11/10/23 0919          Therapy Plan Review/Discharge Plan (OT)    Anticipated Discharge Disposition (OT) skilled nursing facility;home with home health;home with assist  pending on progress in acute  -       Row Name 11/10/23 0919          Vital Signs    Pre Patient Position Supine  -     Intra Patient Position Standing  -KA     Post Patient Position Supine  -       Row Name 11/10/23 0919          Positioning and Restraints    Pre-Treatment Position in bed  -KA     Post Treatment Position bed  -KA     In Bed supine;call light within reach;encouraged to call for assist;exit alarm on;notified [facility]               User Key  (r) = Recorded By, (t) = Taken By, (c) = Cosigned By      Initials Name Provider Type    Genevieve Bland, OT Occupational Therapist                   Outcome Measures       Row Name 11/10/23 0959          How much help from another is currently needed...    Putting on and taking off regular lower body clothing? 1  -KA     Bathing (including washing, rinsing, and drying) 2  -KA     Toileting (which includes using toilet bed pan or urinal) 2  -KA     Putting on and taking off regular upper body clothing 3  -KA     Taking  care of personal grooming (such as brushing teeth) 3  -KA     Eating meals 3  -KA     AM-PAC 6 Clicks Score (OT) 14  -KA       Row Name 11/10/23 0830 11/09/23 2228       How much help from another person do you currently need...    Turning from your back to your side while in flat bed without using bedrails? 3  -JK 3  -TW    Moving from lying on back to sitting on the side of a flat bed without bedrails? 3  -JK 2  -TW    Moving to and from a bed to a chair (including a wheelchair)? 2  -JK 1  -TW    Standing up from a chair using your arms (e.g., wheelchair, bedside chair)? 2  -JK 1  -TW    Climbing 3-5 steps with a railing? 2  -JK 1  -TW    To walk in hospital room? 2  -JK 1  -TW    AM-PAC 6 Clicks Score (PT) 14  -JK 9  -TW    Highest level of mobility 4 --> Transferred to chair/commode  -JK 3 --> Sat at edge of bed  -TW      Row Name 11/10/23 0959          Functional Assessment    Outcome Measure Options AM-PAC 6 Clicks Daily Activity (OT)  -               User Key  (r) = Recorded By, (t) = Taken By, (c) = Cosigned By      Initials Name Provider Type    Ana Young, RN Registered Nurse    Genevieve Bland OT Occupational Therapist    Viviana Sargent, RN Registered Nurse                    Occupational Therapy Education       Title: PT OT SLP Therapies (Done)       Topic: Occupational Therapy (Done)       Point: ADL training (Done)       Description:   Instruct learner(s) on proper safety adaptation and remediation techniques during self care or transfers.   Instruct in proper use of assistive devices.                  Learning Progress Summary             Patient Acceptance, E, DU,VU by JAMILA at 11/10/2023 0959                         Point: Home exercise program (Done)       Description:   Instruct learner(s) on appropriate technique for monitoring, assisting and/or progressing therapeutic exercises/activities.                  Learning Progress Summary             Patient Acceptance, E, DU,VU  "by JAMILA at 11/10/2023 0959                                         User Key       Initials Effective Dates Name Provider Type Discipline     09/22/22 -  Genevieve Mosher OT Occupational Therapist OT                  OT Recommendation and Plan  Planned Therapy Interventions (OT): activity tolerance training, BADL retraining, ROM/therapeutic exercise, strengthening exercise, transfer/mobility retraining, functional balance retraining, passive ROM/stretching, patient/caregiver education/training, occupation/activity based interventions  Therapy Frequency (OT): 5 times/wk  Plan of Care Review  Plan of Care Reviewed With: patient  Outcome Evaluation: Pt seen for OT derick this AM. Admitted after she was found down at home by family. Pt reports she was walking and \"below my knees gave out\". She states she lives alone and is independent with ADLs and uses a three wheeled walker for mobility. Today she performed bed mobility with min A and stood with min A and use of rwx to ambulate to the sink with CGA/min A. She was able to stand for grooming task but noticed to fatigue by the end of activity. She was dep for LBD and max for toileting at this time. Encourage pt to use BSC/bathroom as able to improve overall mobility and strength. Pt presents with decreased strength, endurance, activity tolerance, ADL performance and functional mobility. Pt to benefit from skilled OT to address deficits.     Time Calculation:   Evaluation Complexity (OT)  Review Occupational Profile/Medical/Therapy History Complexity: expanded/moderate complexity  Assessment, Occupational Performance/Identification of Deficit Complexity: 3-5 performance deficits  Clinical Decision Making Complexity (OT): detailed assessment/moderate complexity  Overall Complexity of Evaluation (OT): moderate complexity     Time Calculation- OT       Row Name 11/10/23 0959             Time Calculation- OT    OT Start Time 0906  -      OT Stop Time 0946  -      OT Time " Calculation (min) 40 min  -KA      Total Timed Code Minutes- OT 20 minute(s)  -KA      OT Received On 11/10/23  -KA      OT - Next Appointment 11/13/23  -KA      OT Goal Re-Cert Due Date 11/24/23  -KA         Timed Charges    78040 - OT Self Care/Mgmt Minutes 20  -KA         Untimed Charges    OT Eval/Re-eval Minutes 20  -KA         Total Minutes    Timed Charges Total Minutes 20  -KA      Untimed Charges Total Minutes 20  -KA       Total Minutes 40  -KA                User Key  (r) = Recorded By, (t) = Taken By, (c) = Cosigned By      Initials Name Provider Type    Genevieve Bland OT Occupational Therapist                  Therapy Charges for Today       Code Description Service Date Service Provider Modifiers Qty    44535295786 HC OT SELF CARE/MGMT/TRAIN EA 15 MIN 11/10/2023 Genevieve Mosher OT GO 1    17586202826 HC OT EVAL MOD COMPLEXITY 3 11/10/2023 Genevieve Mosher OT GO 1                 Genevieve Mosher OT  11/10/2023

## 2023-11-11 LAB
ALBUMIN SERPL-MCNC: 2.8 G/DL (ref 3.5–5.2)
ALBUMIN/GLOB SERPL: 0.8 G/DL
ALP SERPL-CCNC: 127 U/L (ref 39–117)
ALT SERPL W P-5'-P-CCNC: 83 U/L (ref 1–33)
ANION GAP SERPL CALCULATED.3IONS-SCNC: 9.2 MMOL/L (ref 5–15)
AST SERPL-CCNC: 112 U/L (ref 1–32)
BILIRUB SERPL-MCNC: 0.5 MG/DL (ref 0–1.2)
BUN SERPL-MCNC: 25 MG/DL (ref 8–23)
BUN/CREAT SERPL: 32.5 (ref 7–25)
CALCIUM SPEC-SCNC: 9.4 MG/DL (ref 8.6–10.5)
CHLORIDE SERPL-SCNC: 111 MMOL/L (ref 98–107)
CO2 SERPL-SCNC: 21.8 MMOL/L (ref 22–29)
CREAT SERPL-MCNC: 0.77 MG/DL (ref 0.57–1)
DEPRECATED RDW RBC AUTO: 41.6 FL (ref 37–54)
EGFRCR SERPLBLD CKD-EPI 2021: 79.1 ML/MIN/1.73
ERYTHROCYTE [DISTWIDTH] IN BLOOD BY AUTOMATED COUNT: 12.5 % (ref 12.3–15.4)
GLOBULIN UR ELPH-MCNC: 3.7 GM/DL
GLUCOSE SERPL-MCNC: 106 MG/DL (ref 65–99)
HCT VFR BLD AUTO: 33.1 % (ref 34–46.6)
HGB BLD-MCNC: 11 G/DL (ref 12–15.9)
MCH RBC QN AUTO: 30.1 PG (ref 26.6–33)
MCHC RBC AUTO-ENTMCNC: 33.2 G/DL (ref 31.5–35.7)
MCV RBC AUTO: 90.4 FL (ref 79–97)
PLATELET # BLD AUTO: 306 10*3/MM3 (ref 140–450)
PMV BLD AUTO: 11 FL (ref 6–12)
POTASSIUM SERPL-SCNC: 3.6 MMOL/L (ref 3.5–5.2)
POTASSIUM SERPL-SCNC: 4.7 MMOL/L (ref 3.5–5.2)
PROT SERPL-MCNC: 6.5 G/DL (ref 6–8.5)
RBC # BLD AUTO: 3.66 10*6/MM3 (ref 3.77–5.28)
SODIUM SERPL-SCNC: 142 MMOL/L (ref 136–145)
WBC NRBC COR # BLD: 13.3 10*3/MM3 (ref 3.4–10.8)

## 2023-11-11 PROCEDURE — 84132 ASSAY OF SERUM POTASSIUM: CPT | Performed by: STUDENT IN AN ORGANIZED HEALTH CARE EDUCATION/TRAINING PROGRAM

## 2023-11-11 PROCEDURE — 96372 THER/PROPH/DIAG INJ SC/IM: CPT

## 2023-11-11 PROCEDURE — G0378 HOSPITAL OBSERVATION PER HR: HCPCS

## 2023-11-11 PROCEDURE — 85027 COMPLETE CBC AUTOMATED: CPT | Performed by: STUDENT IN AN ORGANIZED HEALTH CARE EDUCATION/TRAINING PROGRAM

## 2023-11-11 PROCEDURE — 80053 COMPREHEN METABOLIC PANEL: CPT | Performed by: STUDENT IN AN ORGANIZED HEALTH CARE EDUCATION/TRAINING PROGRAM

## 2023-11-11 PROCEDURE — 25010000002 ENOXAPARIN PER 10 MG: Performed by: STUDENT IN AN ORGANIZED HEALTH CARE EDUCATION/TRAINING PROGRAM

## 2023-11-11 RX ORDER — POTASSIUM CHLORIDE 750 MG/1
40 TABLET, FILM COATED, EXTENDED RELEASE ORAL EVERY 4 HOURS
Status: COMPLETED | OUTPATIENT
Start: 2023-11-11 | End: 2023-11-11

## 2023-11-11 RX ADMIN — LEVOTHYROXINE SODIUM 112 MCG: 112 TABLET ORAL at 06:53

## 2023-11-11 RX ADMIN — SERTRALINE 50 MG: 50 TABLET, FILM COATED ORAL at 09:00

## 2023-11-11 RX ADMIN — ENOXAPARIN SODIUM 40 MG: 100 INJECTION SUBCUTANEOUS at 12:57

## 2023-11-11 RX ADMIN — ANTACID TABLETS 2 TABLET: 500 TABLET, CHEWABLE ORAL at 12:56

## 2023-11-11 RX ADMIN — POTASSIUM CHLORIDE 40 MEQ: 750 TABLET, EXTENDED RELEASE ORAL at 12:53

## 2023-11-11 RX ADMIN — Medication 10 ML: at 09:00

## 2023-11-11 RX ADMIN — LOSARTAN POTASSIUM 25 MG: 25 TABLET, FILM COATED ORAL at 09:00

## 2023-11-11 RX ADMIN — LEVOFLOXACIN 750 MG: 750 TABLET, FILM COATED ORAL at 12:56

## 2023-11-11 RX ADMIN — POTASSIUM CHLORIDE 40 MEQ: 750 TABLET, EXTENDED RELEASE ORAL at 16:36

## 2023-11-11 NOTE — PLAN OF CARE
Goal Outcome Evaluation:      Pt is alert and oriented. PT is on RA. Pt requested Tums d/t indigestion, interventions were effective. Pt  with ambulated to BSC. Pt is resting in bed. Plan of care ongoing

## 2023-11-11 NOTE — PROGRESS NOTES
Name: Sammi Curtis ADMIT: 2023   : 1945  PCP: Dary Mcbride APRN    MRN: [id number] LOS: 0 days   AGE/SEX: 78 y.o. female  ROOM: [facility]     Subjective     White blood cell count continues to improved.  Urine culture with E. coli.  Currently on Levaquin.    Objective   Objective   Vital Signs  Temp:  [98.4 °F (36.9 °C)-99.5 °F (37.5 °C)] 98.8 °F (37.1 °C)  Heart Rate:  [71-88] 71  Resp:  [16-18] 16  BP: (134-174)/(75-85) 157/85  SpO2:  [94 %-96 %] 94 %  on   ;   Device (Oxygen Therapy): room air  Body mass index is 29.63 kg/m².  Physical Exam    General: Alert and oriented x3, no acute distress  HEENT: Normocephalic, atraumatic  CV: Regular rate and rhythm, no murmurs rubs or gallops  Lungs: Clear to auscultation bilaterally, no crackles or wheezes  Abdomen: Soft, nontender, nondistended  Neuro: CN II-XII intact, no FND appreciated     Results Review     I reviewed the patient's new clinical results.  Results from last 7 days   Lab Units 23  0817 11/10/23  0642 11/09/23  1752   WBC 10*3/mm3 13.30* 16.58* 20.12*   HEMOGLOBIN g/dL 11.0* 10.5* 11.9*   PLATELETS 10*3/mm3 306 287 341     Results from last 7 days   Lab Units 11/11/23  0817 11/10/23  0642 11/09/23  1944   SODIUM mmol/L 142 143 142   POTASSIUM mmol/L 3.6 3.1* 2.8*   CHLORIDE mmol/L 111* 109* 106   CO2 mmol/L 21.8* 24.4 24.0   BUN mg/dL 25* 35* 45*   CREATININE mg/dL 0.77 1.01* 1.12*   GLUCOSE mg/dL 106* 107* 93   Estimated Creatinine Clearance: 56.6 mL/min (by C-G formula based on SCr of 0.77 mg/dL).  Results from last 7 days   Lab Units 11/11/23  0817 11/10/23  0642 11/09/23  1944   ALBUMIN g/dL 2.8* 2.9* 3.3*   BILIRUBIN mg/dL 0.5 0.4 0.5   ALK PHOS U/L 127* 144* 166*   AST (SGOT) U/L 112* 115* 82*   ALT (SGPT) U/L 83* 74* 56*     Results from last 7 days   Lab Units 23  0817 11/10/23  0642 11/09/23  1944   CALCIUM mg/dL 9.4 9.0 9.6   ALBUMIN g/dL 2.8* 2.9* 3.3*   MAGNESIUM mg/dL  --  2.1 1.9     Results from last 7  "days   Lab Units 11/09/23 1944   LACTATE mmol/L 1.3   No results found for: \"COVID19\"  No results found for: \"HGBA1C\", \"POCGLU\"  Results for orders placed or performed during the hospital encounter of 11/09/23   Blood Culture - Blood, Arm, Right    Specimen: Arm, Right; Blood   Result Value Ref Range    Blood Culture No growth at 24 hours          CT Head Without Contrast, CT Cervical Spine Without Contrast  Narrative: CT HEAD AND CERVICAL SPINE WITHOUT CONTRAST     CLINICAL HISTORY: [Fall]     TECHNIQUE: CT scan of the head and cervical spine was obtained with  axial soft tissue and bone algorithm images. No intravenous contrast was  administered. Sagittal and coronal reconstructions were obtained.     COMPARISON: [None available]     FINDINGS:  No intracranial hemorrhage.  No midline shift or mass effect.   No CT evidence of acute territorial infarction.  No extraaxial  collection.  No hydrocephalus.  Clear visualized portions of the  paranasal sinuses and mastoid air cells.     Straightening of the normal cervical lordosis with minimal C3 on C4  anterolisthesis and minimal C5 on C6 retrolisthesis. Multilevel disc  degeneration, moderate at C4-C7. Multilevel facet arthropathy, moderate  bilaterally at C2-C6. Likely mild spinal canal stenosis at C4-C7  secondary to posterior disc osteophyte complexes. No prevertebral soft  tissue swelling.        Impression: 1. No acute intracranial abnormality.  2. No cervical spine fracture or traumatic subluxation. Degenerative  changes as above.              Radiation dose reduction techniques were utilized, including automated  exposure control and exposure modulation based on body size.     This report was finalized on 11/9/2023 7:13 PM by Dr. Rashad Quintero M.D on Workstation: [id number]     XR Chest 1 View  Narrative: Portable chest radiograph     HISTORY: Weakness     TECHNIQUE: Single AP portable radiograph of the chest     COMPARISON: None     Impression: FINDINGS AND " IMPRESSION:  Minimal atelectasis and or pleural parenchymal scarring is present  within the bilateral lung bases. No pneumothorax or pleural effusion is  seen. Cardiac silhouette is within normal its for size. Mild asymmetric  elevation of the right ami diaphragm.     This report was finalized on 11/9/2023 7:09 PM by Dr. Davis Sanchez M.D  on Workstation: [id number]       Scheduled Medications  enoxaparin, 40 mg, Subcutaneous, Q24H  levoFLOXacin, 750 mg, Oral, Q24H  levothyroxine, 112 mcg, Oral, Q AM  losartan, 25 mg, Oral, Daily  senna-docusate sodium, 2 tablet, Oral, BID  sertraline, 50 mg, Oral, Daily  sodium chloride, 10 mL, Intravenous, Q12H    Infusions  sodium chloride, 75 mL/hr, Last Rate: Stopped (11/10/23 1013)    Diet  Diet: Regular/House Diet; Texture: Regular Texture (IDDSI 7); Fluid Consistency: Thin (IDDSI 0)       Assessment/Plan     Active Hospital Problems    Diagnosis  POA    **Acute UTI [N39.0]  Yes      Resolved Hospital Problems   No resolved problems to display.       78 y.o. female admitted with Acute UTI.    Acute urinary tract infection  -Initially on rocephin however LFTs worsening. DC and start levaquin   Gentle fluids  Follow-up blood and urine cultures. Urine culture with E. Coli. Sensitivities pending      Generalized weakness  Fall  PT/OT evaluation management     Hypokalemia  Replace as needed  Check magnesium     Transaminitis  AST and ALT are slightly elevated.  She was on atorvastatin which will be discontinued.  Ceftriaxone can also be implicated in cholestasis with some possible elevated LFTs initially so transitioned to Levaquin.  Check hepatitis panel-negative  Denies alcohol use  Continue to trend. Appears as though they have peaked         SCDs for DVT prophylaxis.  Full code.  Discussed with patient and nursing staff.  Anticipate discharge tbd, when arrangements have been made       Tacos Arreaga MD  [facility]  11/11/23  12:11 EST

## 2023-11-11 NOTE — PLAN OF CARE
Problem: Adult Inpatient Plan of Care  Goal: Plan of Care Review  Outcome: Ongoing, Progressing  Flowsheets (Taken 11/11/2023 1341)  Progress: improving  Plan of Care Reviewed With: patient  Outcome Evaluation: Patient has been pleasant and cooperative during shift. PT and OT ordered. Patient trying to decide between home, home health, and rehab. Replacing potassium, redraw at 2100. No complaints of pain, nausea or SOA. AOx4, assist x1/BSC, room air, SR. Will continue to monitor and assist patient as needed.  Goal: Patient-Specific Goal (Individualized)  Outcome: Ongoing, Progressing  Goal: Absence of Hospital-Acquired Illness or Injury  Outcome: Ongoing, Progressing  Intervention: Identify and Manage Fall Risk  Recent Flowsheet Documentation  Taken 11/11/2023 1200 by Myles Verdugo RN  Safety Promotion/Fall Prevention:   assistive device/personal items within reach   fall prevention program maintained   nonskid shoes/slippers when out of bed   safety round/check completed  Taken 11/11/2023 1000 by Myles Verdugo RN  Safety Promotion/Fall Prevention:   assistive device/personal items within reach   fall prevention program maintained   nonskid shoes/slippers when out of bed   safety round/check completed  Taken 11/11/2023 0927 by Myles Verdugo RN  Safety Promotion/Fall Prevention:   assistive device/personal items within reach   fall prevention program maintained   nonskid shoes/slippers when out of bed   safety round/check completed  Intervention: Prevent Skin Injury  Recent Flowsheet Documentation  Taken 11/11/2023 1200 by Myles Verdugo RN  Body Position: supine  Taken 11/11/2023 1000 by Myles Verdugo RN  Body Position: supine  Taken 11/11/2023 0927 by Myles Verdugo RN  Body Position: supine  Goal: Optimal Comfort and Wellbeing  Outcome: Ongoing, Progressing  Goal: Readiness for Transition of Care  Outcome: Ongoing, Progressing     Problem: Fall Injury Risk  Goal: Absence of Fall and  Fall-Related Injury  Outcome: Ongoing, Progressing  Intervention: Promote Injury-Free Environment  Recent Flowsheet Documentation  Taken 11/11/2023 1200 by Myles Verdugo RN  Safety Promotion/Fall Prevention:   assistive device/personal items within reach   fall prevention program maintained   nonskid shoes/slippers when out of bed   safety round/check completed  Taken 11/11/2023 1000 by Myles Verdugo, RN  Safety Promotion/Fall Prevention:   assistive device/personal items within reach   fall prevention program maintained   nonskid shoes/slippers when out of bed   safety round/check completed  Taken 11/11/2023 0927 by Myles Verdugo, RN  Safety Promotion/Fall Prevention:   assistive device/personal items within reach   fall prevention program maintained   nonskid shoes/slippers when out of bed   safety round/check completed     Problem: Asthma Comorbidity  Goal: Maintenance of Asthma Control  Outcome: Ongoing, Progressing     Problem: Behavioral Health Comorbidity  Goal: Maintenance of Behavioral Health Symptom Control  Outcome: Ongoing, Progressing     Problem: COPD (Chronic Obstructive Pulmonary Disease) Comorbidity  Goal: Maintenance of COPD Symptom Control  Outcome: Ongoing, Progressing     Problem: Diabetes Comorbidity  Goal: Blood Glucose Level Within Targeted Range  Outcome: Ongoing, Progressing     Problem: Heart Failure Comorbidity  Goal: Maintenance of Heart Failure Symptom Control  Outcome: Ongoing, Progressing     Problem: Hypertension Comorbidity  Goal: Blood Pressure in Desired Range  Outcome: Ongoing, Progressing     Problem: Obstructive Sleep Apnea Risk or Actual Comorbidity Management  Goal: Unobstructed Breathing During Sleep  Outcome: Ongoing, Progressing     Problem: Osteoarthritis Comorbidity  Goal: Maintenance of Osteoarthritis Symptom Control  Outcome: Ongoing, Progressing     Problem: Pain Chronic (Persistent) (Comorbidity Management)  Goal: Acceptable Pain Control and Functional  Ability  Outcome: Ongoing, Progressing     Problem: Seizure Disorder Comorbidity  Goal: Maintenance of Seizure Control  Outcome: Ongoing, Progressing   Goal Outcome Evaluation:  Plan of Care Reviewed With: patient        Progress: improving  Outcome Evaluation: Patient has been pleasant and cooperative during shift. PT and OT ordered. Patient trying to decide between home, home health, and rehab. Replacing potassium, redraw at 2100. No complaints of pain, nausea or SOA. AOx4, assist x1/BSC, room air, SR. Will continue to monitor and assist patient as needed.

## 2023-11-12 LAB
ALBUMIN SERPL-MCNC: 2.7 G/DL (ref 3.5–5.2)
ALBUMIN/GLOB SERPL: 0.8 G/DL
ALP SERPL-CCNC: 108 U/L (ref 39–117)
ALT SERPL W P-5'-P-CCNC: 55 U/L (ref 1–33)
ANION GAP SERPL CALCULATED.3IONS-SCNC: 8.3 MMOL/L (ref 5–15)
AST SERPL-CCNC: 61 U/L (ref 1–32)
BACTERIA SPEC AEROBE CULT: ABNORMAL
BILIRUB SERPL-MCNC: 0.4 MG/DL (ref 0–1.2)
BUN SERPL-MCNC: 14 MG/DL (ref 8–23)
BUN/CREAT SERPL: 20.9 (ref 7–25)
CALCIUM SPEC-SCNC: 9 MG/DL (ref 8.6–10.5)
CHLORIDE SERPL-SCNC: 106 MMOL/L (ref 98–107)
CO2 SERPL-SCNC: 23.7 MMOL/L (ref 22–29)
CREAT SERPL-MCNC: 0.67 MG/DL (ref 0.57–1)
DEPRECATED RDW RBC AUTO: 43.6 FL (ref 37–54)
EGFRCR SERPLBLD CKD-EPI 2021: 89.6 ML/MIN/1.73
ERYTHROCYTE [DISTWIDTH] IN BLOOD BY AUTOMATED COUNT: 12.9 % (ref 12.3–15.4)
GLOBULIN UR ELPH-MCNC: 3.5 GM/DL
GLUCOSE SERPL-MCNC: 107 MG/DL (ref 65–99)
HCT VFR BLD AUTO: 32.3 % (ref 34–46.6)
HGB BLD-MCNC: 10.4 G/DL (ref 12–15.9)
MCH RBC QN AUTO: 29.7 PG (ref 26.6–33)
MCHC RBC AUTO-ENTMCNC: 32.2 G/DL (ref 31.5–35.7)
MCV RBC AUTO: 92.3 FL (ref 79–97)
PLATELET # BLD AUTO: 298 10*3/MM3 (ref 140–450)
PMV BLD AUTO: 10.8 FL (ref 6–12)
POTASSIUM SERPL-SCNC: 4 MMOL/L (ref 3.5–5.2)
PROT SERPL-MCNC: 6.2 G/DL (ref 6–8.5)
RBC # BLD AUTO: 3.5 10*6/MM3 (ref 3.77–5.28)
SODIUM SERPL-SCNC: 138 MMOL/L (ref 136–145)
WBC NRBC COR # BLD: 15.27 10*3/MM3 (ref 3.4–10.8)

## 2023-11-12 PROCEDURE — G0378 HOSPITAL OBSERVATION PER HR: HCPCS

## 2023-11-12 PROCEDURE — 85027 COMPLETE CBC AUTOMATED: CPT | Performed by: STUDENT IN AN ORGANIZED HEALTH CARE EDUCATION/TRAINING PROGRAM

## 2023-11-12 PROCEDURE — 97530 THERAPEUTIC ACTIVITIES: CPT | Performed by: PHYSICAL THERAPIST

## 2023-11-12 PROCEDURE — 80053 COMPREHEN METABOLIC PANEL: CPT | Performed by: STUDENT IN AN ORGANIZED HEALTH CARE EDUCATION/TRAINING PROGRAM

## 2023-11-12 PROCEDURE — 96372 THER/PROPH/DIAG INJ SC/IM: CPT

## 2023-11-12 PROCEDURE — 99223 1ST HOSP IP/OBS HIGH 75: CPT | Performed by: STUDENT IN AN ORGANIZED HEALTH CARE EDUCATION/TRAINING PROGRAM

## 2023-11-12 PROCEDURE — 25010000002 ENOXAPARIN PER 10 MG: Performed by: STUDENT IN AN ORGANIZED HEALTH CARE EDUCATION/TRAINING PROGRAM

## 2023-11-12 PROCEDURE — 97110 THERAPEUTIC EXERCISES: CPT | Performed by: PHYSICAL THERAPIST

## 2023-11-12 RX ORDER — SULFAMETHOXAZOLE AND TRIMETHOPRIM 800; 160 MG/1; MG/1
1 TABLET ORAL EVERY 12 HOURS SCHEDULED
Status: DISCONTINUED | OUTPATIENT
Start: 2023-11-12 | End: 2023-11-15

## 2023-11-12 RX ADMIN — SULFAMETHOXAZOLE AND TRIMETHOPRIM 1 TABLET: 800; 160 TABLET ORAL at 12:26

## 2023-11-12 RX ADMIN — SULFAMETHOXAZOLE AND TRIMETHOPRIM 1 TABLET: 800; 160 TABLET ORAL at 21:57

## 2023-11-12 RX ADMIN — ENOXAPARIN SODIUM 40 MG: 100 INJECTION SUBCUTANEOUS at 12:26

## 2023-11-12 RX ADMIN — LEVOTHYROXINE SODIUM 112 MCG: 112 TABLET ORAL at 08:00

## 2023-11-12 RX ADMIN — SERTRALINE 50 MG: 50 TABLET, FILM COATED ORAL at 08:00

## 2023-11-12 RX ADMIN — Medication 10 ML: at 08:00

## 2023-11-12 RX ADMIN — LOSARTAN POTASSIUM 25 MG: 25 TABLET, FILM COATED ORAL at 08:00

## 2023-11-12 RX ADMIN — ANTACID TABLETS 2 TABLET: 500 TABLET, CHEWABLE ORAL at 17:26

## 2023-11-12 NOTE — THERAPY TREATMENT NOTE
Patient Name: Sammi Curtis  : 1945    MRN: [id number]                              Today's Date: 2023       Admit Date: 2023    Visit Dx:     ICD-10-CM ICD-9-CM   1. Generalized weakness  R53.1 780.79   2. Urinary tract infection without hematuria, site unspecified  N39.0 599.0   3. Hypokalemia  E87.6 276.8     Patient Active Problem List   Diagnosis    Acute UTI     Past Medical History:   Diagnosis Date    Arthritis     Disease of thyroid gland     Elevated cholesterol      History reviewed. No pertinent surgical history.   General Information       Row Name 23 1624          Physical Therapy Time and Intention    Document Type therapy note (daily note)  -     Mode of Treatment individual therapy;physical therapy  -               User Key  (r) = Recorded By, (t) = Taken By, (c) = Cosigned By      Initials Name Provider Type    Paty Abrams, PT Physical Therapist                   Mobility       Row Name 23 1626          Bed Mobility    Supine-Sit [place] (Bed Mobility) minimum assist (75% patient effort)  -     Sit-Supine [place] (Bed Mobility) minimum assist (75% patient effort)  -     Assistive Device (Bed Mobility) bed rails;head of bed elevated  -       Row Name 23 1626          Sit-Stand Transfer    Sit-Stand [place] (Transfers) minimum assist (75% patient effort)  -     Assistive Device (Sit-Stand Transfers) walker, front-wheeled  -       Row Name 23 1626          Gait/Stairs (Locomotion)    [place] Level (Gait) minimum assist (75% patient effort)  -     Assistive Device (Gait) walker, front-wheeled  -     Distance in Feet (Gait) 20 ft  -     Deviations/Abnormal Patterns (Gait) stride length decreased;gait speed decreased  -     Bilateral Gait Deviations forward flexed posture  -     Comment, (Gait/Stairs) 2 brief standing rest breaks secondary to fatigue  -               User Key  (r) = Recorded By, (t) =  Taken By, (c) = Cosigned By      Initials Name Provider Type    Paty Abrams PT Physical Therapist                   Obj/Interventions       Row Name 11/12/23 1628          Motor Skills    Therapeutic Exercise --  BLE AP, LAQ , seated marching x 10 reps  -               User Key  (r) = Recorded By, (t) = Taken By, (c) = Cosigned By      Initials Name Provider Type    Paty Abrams PT Physical Therapist                   Goals/Plan    No documentation.                  Clinical Impression       [facility] Name 11/12/23 1629          Pain    Pretreatment Pain Rating 0/10 - no pain  -     Posttreatment Pain Rating 3/10  -SAVANNAH     Pain Location - Side/Orientation Bilateral  -SAVANNAH     Pain Location lower  -SAVANNAH     Pain Location - extremity  -     Pain Intervention(s) Repositioned;Rest  -KH       Row Name 11/12/23 1629          Plan of Care Review    Plan of Care Reviewed With patient  -     Outcome Evaluation Pt was in bed and agreeable to therapy. required increased assistance for mobility today. Activity tolerance was decreased. Pt only able to ambulate 20 ft today. Fatigued quickly. Would benefit from SNF placement to improve mobility and independence prior to returning home.  -[facility] Name 11/12/23 1629          Positioning and Restraints    Pre-Treatment Position in bed  -     Post Treatment Position bed  -     In Bed fowlers;call light within reach;encouraged to call for assist;exit alarm on;[place] [facility] [place]  -               User Key  (r) = Recorded By, (t) = Taken By, (c) = Cosigned By      Initials Name Provider Type    Paty Abrams, RACH Physical Therapist                   Outcome Measures       Row Name 11/12/23 1634 11/12/23 0801       How much help from another person do you currently need...    Turning from your back to your side while in flat bed without using bedrails? 3  -SAVANNAH 4  -MS    Moving from lying on back to sitting on the side of a flat bed without  bedrails? 3  -KH 4  -MS    Moving to and from a bed to a chair (including a wheelchair)? 3  -KH 3  -MS    Standing up from a chair using your arms (e.g., wheelchair, bedside chair)? 3  -KH 3  -MS    Climbing 3-5 steps with a railing? 1  -KH 2  -MS    To walk in hospital room? 3  -KH 2  -MS    AM-PAC 6 Clicks Score (PT) 16  -KH 18  -MS    Highest level of mobility 5 --> Static standing  -KH 6 --> Walked 10 steps or more  -MS      Row Name 11/12/23 1634          Functional Assessment    Outcome Measure Options AM-PAC 6 Clicks Basic Mobility (PT)  -               User Key  (r) = Recorded By, (t) = Taken By, (c) = Cosigned By      Initials Name Provider Type    Paty Abrams, PT Physical Therapist    Myles Fry, RN Registered Nurse                                 Physical Therapy Education       Title: PT OT SLP Therapies (Done)       Topic: Physical Therapy (Done)       Point: Mobility training (Done)       Learning Progress Summary             Patient Acceptance, E, VU,NR by  at 11/12/2023 1635                         Point: Home exercise program (Done)       Learning Progress Summary             Patient Acceptance, E, VU,NR by  at 11/12/2023 1635                         Point: Body mechanics (Done)       Learning Progress Summary             Patient Acceptance, E, VU,NR by  at 11/12/2023 1635                         Point: Precautions (Done)       Learning Progress Summary             Patient Acceptance, E, VU,NR by  at 11/12/2023 1635                                         User Key       Initials Effective Dates Name Provider Type Discipline     07/11/23 -  Paty Nowak, PT Physical Therapist PT                  PT Recommendation and Plan  Planned Therapy Interventions (PT): gait training, home exercise program, strengthening, transfer training, patient/family education  Plan of Care Reviewed With: patient  Outcome Evaluation: Pt was in bed and agreeable to therapy.  required increased assistance for mobility today. Activity tolerance was decreased. Pt only able to ambulate 20 ft today. Fatigued quickly. Would benefit from SNF placement to improve mobility and independence prior to returning home.     Time Calculation:         PT Charges       Row Name 11/12/23 1635             Time Calculation    Start Time 1520  -KH      Stop Time 1543  -KH      Time Calculation (min) 23 min  -KH      PT Received On 11/12/23  -KH      PT - Next Appointment 11/13/23  -         Time Calculation- PT    Total Timed Code Minutes- PT 23 minute(s)  -KH         Timed Charges    36756 - PT Therapeutic Exercise Minutes 8  -KH      51939 - PT Therapeutic Activity Minutes 15  -KH         Total Minutes    Timed Charges Total Minutes 23  -KH       Total Minutes 23  -KH                User Key  (r) = Recorded By, (t) = Taken By, (c) = Cosigned By      Initials Name Provider Type    Paty Abrams, RACH Physical Therapist                  Therapy Charges for Today       Code Description Service Date Service Provider Modifiers Qty    87584612162 HC PT THER PROC EA 15 MIN 11/12/2023 Paty Nowak, PT GP 1    20674029751 HC PT THERAPEUTIC ACT EA 15 MIN 11/12/2023 Paty Nowak, PT GP 1            PT G-Codes  Outcome Measure Options: AM-PAC 6 Clicks Basic Mobility (PT)  AM-PAC 6 Clicks Score (PT): 16  AM-PAC 6 Clicks Score (OT): 14  PT Discharge Summary  Anticipated Discharge Disposition (PT): skilled nursing facility    Paty Nowak PT  11/12/2023

## 2023-11-12 NOTE — PLAN OF CARE
Goal Outcome Evaluation:  Plan of Care Reviewed With: patient           Outcome Evaluation: Pt was in bed and agreeable to therapy. required increased assistance for mobility today. Activity tolerance was decreased. Pt only able to ambulate 20 ft today. Fatigued quickly. Would benefit from SNF placement to improve mobility and independence prior to returning home.      Anticipated Discharge Disposition (PT): skilled nursing facility

## 2023-11-12 NOTE — CONSULTS
Referring Provider: Boyd Hough MD  Reason for Consultation:     ESBL uti     Chief Complaint   Patient presents with    Fall         Subjective   History of present illness:   Patient is a 78-year-old female who presented to the hospital with weakness and fall.  ID consulted for ESBL UTI.    This is reportedly her second UTI in 1 month and developed weakness that precipitated a fall at home.  On presentation patient has been afebrile with initial leukocytosis of 20.  WBC improved to 13 most recently.  Lactate on admission was normal and she was initially started on ceftriaxone and transition to levofloxacin 750 mg.  Urine culture now growing ESBL E. coli resistant to levofloxacin.    Patient reports she was having dysuria however this is improved.  Denies any further fevers or chills.  Denies any nausea, vomiting, diarrhea.  Reports she is tolerating antibiotics well.  Reports she has had difficulty with lower extremity weakness for approximately 6 months.    Past Medical History:   Diagnosis Date    Arthritis     Disease of thyroid gland     Elevated cholesterol        History reviewed. No pertinent surgical history.    family history is not on file.     reports that she quit smoking about 32 years ago. Her smoking use included cigarettes. She has a 50.00 pack-year smoking history. She has never used smokeless tobacco. She reports that she does not drink alcohol and does not use drugs.     No Known Allergies    Medication:  Antibiotics:  Anti-Infectives (From admission, onward)      Ordered     Dose/Rate Route Frequency Start Stop    11/10/23 1331  levoFLOXacin (LEVAQUIN) tablet 750 mg        Ordering Provider: Tacos Arreaga MD    750 mg Oral Every 24 Hours 11/10/23 1330 11/11/23 1256    11/09/23 1917  cefTRIAXone (ROCEPHIN) 1,000 mg in sodium chloride 0.9 % 100 mL IVPB-VTB        Ordering Provider: Boyd Hough MD    1,000 mg  200 mL/hr over 30 Minutes Intravenous Once 11/09/23 1933 11/09/23 2033       "        Objective     Physical Exam:   Vital Signs   Temp:  [97.7 °F (36.5 °C)-99.1 °F (37.3 °C)] 97.8 °F (36.6 °C)  Heart Rate:  [57-67] 67  Resp:  [18] 18  BP: (116-185)/(51-91) 185/88    GENERAL: Awake and alert, in no acute distress.   HEENT: Oropharynx is clear. Hearing is grossly normal.   EYES: PERRL. No conjunctival injection. No lid lag.    LUNGS: Normal work of breathing  GI: Soft, nontender, nondistended.   SKIN: Warm and dry without cutaneous eruptions in exposed areas  PSYCHIATRIC: Appropriate mood, affect, insight, and judgment.     Results Review:   I reviewed the patient's new clinical results.  I reviewed the patient's new imaging results and agree with the interpretation.  I reviewed the patient's other test results and agree with the interpretation    Lab Results   Component Value Date    WBC 13.30 (H) 11/11/2023    HGB 11.0 (L) 11/11/2023    HCT 33.1 (L) 11/11/2023    MCV 90.4 11/11/2023     11/11/2023       No results found for: \"VANCOPEAK\", \"VANCOTROUGH\", \"VANCORANDOM\"    Lab Results   Component Value Date    GLUCOSE 106 (H) 11/11/2023    BUN 25 (H) 11/11/2023    CREATININE 0.77 11/11/2023    BCR 32.5 (H) 11/11/2023    CO2 21.8 (L) 11/11/2023    CALCIUM 9.4 11/11/2023    ALBUMIN 2.8 (L) 11/11/2023    LABIL2 1.1 09/17/2021     (H) 11/11/2023    ALT 83 (H) 11/11/2023         Estimated Creatinine Clearance: 56.6 mL/min (by C-G formula based on SCr of 0.77 mg/dL).      Microbiology:  11/9 urine culture ESBL E. coli  11/9 blood cultures no growth to date    Radiology:  11/9 chest x-ray reviewed by me with no acute infiltrate.    11/9 CT cervical spine report reviewed with no cervical spine fracture or trauma subluxation.    11/9 CT head report reviewed with no acute intracranial abnormality    Assessment     #ESBL E. coli UTI  #Leukocytosis improving  #MILDRED  #Mechanical fall     Blood cultures remain negative and leukocytosis improving.  Urine culture with ESBL E. coli.  Recommend " transition to Bactrim 1 DS tablet twice daily for 7-day course for complicated UTI.    Thank you for allowing me to be involved in the care of this patient. Infectious diseases will sign off at this time with antibiotics plan in place, but please call me at [phone] if any further ID questions or new ID concerns.

## 2023-11-12 NOTE — PLAN OF CARE
Problem: Adult Inpatient Plan of Care  Goal: Plan of Care Review  Outcome: Ongoing, Progressing  Flowsheets (Taken 11/12/2023 1453)  Progress: improving  Plan of Care Reviewed With: patient  Outcome Evaluation: Patient has been pleasant and cooperative during shift. No complaints of pain, nausea or SOA. CCP called and they will discuss rehab/HH today or tomorrow. ID consulted for ESBL UTI and recommended bactrim PO. HTN. AOx4, assist x1, room air, SR. BSC. Will continue to monitor and assist patient as needed.  Goal: Patient-Specific Goal (Individualized)  Outcome: Ongoing, Progressing  Goal: Absence of Hospital-Acquired Illness or Injury  Outcome: Ongoing, Progressing  Intervention: Identify and Manage Fall Risk  Recent Flowsheet Documentation  Taken 11/12/2023 1400 by Myles Verdugo RN  Safety Promotion/Fall Prevention:   assistive device/personal items within reach   fall prevention program maintained   nonskid shoes/slippers when out of bed   safety round/check completed  Taken 11/12/2023 1200 by Myles Verdugo RN  Safety Promotion/Fall Prevention:   assistive device/personal items within reach   fall prevention program maintained   nonskid shoes/slippers when out of bed   safety round/check completed  Taken 11/12/2023 1000 by Myles Verdugo RN  Safety Promotion/Fall Prevention:   assistive device/personal items within reach   fall prevention program maintained   nonskid shoes/slippers when out of bed   safety round/check completed  Taken 11/12/2023 0801 by Myles Verdugo RN  Safety Promotion/Fall Prevention:   assistive device/personal items within reach   fall prevention program maintained   nonskid shoes/slippers when out of bed   safety round/check completed  Intervention: Prevent Skin Injury  Recent Flowsheet Documentation  Taken 11/12/2023 1400 by Myles Verdugo RN  Body Position: supine  Taken 11/12/2023 1200 by Myles Verdugo RN  Body Position: supine  Taken 11/12/2023 1000 by  Myles Verdugo RN  Body Position:   right   position changed independently   tilted  Taken 11/12/2023 0801 by Myles Verdugo RN  Body Position: supine  Goal: Optimal Comfort and Wellbeing  Outcome: Ongoing, Progressing  Goal: Readiness for Transition of Care  Outcome: Ongoing, Progressing     Problem: Fall Injury Risk  Goal: Absence of Fall and Fall-Related Injury  Outcome: Ongoing, Progressing  Intervention: Promote Injury-Free Environment  Recent Flowsheet Documentation  Taken 11/12/2023 1400 by Myles Verdugo RN  Safety Promotion/Fall Prevention:   assistive device/personal items within reach   fall prevention program maintained   nonskid shoes/slippers when out of bed   safety round/check completed  Taken 11/12/2023 1200 by Myles Verdugo RN  Safety Promotion/Fall Prevention:   assistive device/personal items within reach   fall prevention program maintained   nonskid shoes/slippers when out of bed   safety round/check completed  Taken 11/12/2023 1000 by Myles Verdugo RN  Safety Promotion/Fall Prevention:   assistive device/personal items within reach   fall prevention program maintained   nonskid shoes/slippers when out of bed   safety round/check completed  Taken 11/12/2023 0801 by Myles Verdugo RN  Safety Promotion/Fall Prevention:   assistive device/personal items within reach   fall prevention program maintained   nonskid shoes/slippers when out of bed   safety round/check completed     Problem: Asthma Comorbidity  Goal: Maintenance of Asthma Control  Outcome: Ongoing, Progressing     Problem: Behavioral Health Comorbidity  Goal: Maintenance of Behavioral Health Symptom Control  Outcome: Ongoing, Progressing     Problem: COPD (Chronic Obstructive Pulmonary Disease) Comorbidity  Goal: Maintenance of COPD Symptom Control  Outcome: Ongoing, Progressing     Problem: Diabetes Comorbidity  Goal: Blood Glucose Level Within Targeted Range  Outcome: Ongoing, Progressing     Problem: Heart Failure  Comorbidity  Goal: Maintenance of Heart Failure Symptom Control  Outcome: Ongoing, Progressing     Problem: Hypertension Comorbidity  Goal: Blood Pressure in Desired Range  Outcome: Ongoing, Progressing     Problem: Obstructive Sleep Apnea Risk or Actual Comorbidity Management  Goal: Unobstructed Breathing During Sleep  Outcome: Ongoing, Progressing     Problem: Osteoarthritis Comorbidity  Goal: Maintenance of Osteoarthritis Symptom Control  Outcome: Ongoing, Progressing     Problem: Pain Chronic (Persistent) (Comorbidity Management)  Goal: Acceptable Pain Control and Functional Ability  Outcome: Ongoing, Progressing     Problem: Seizure Disorder Comorbidity  Goal: Maintenance of Seizure Control  Outcome: Ongoing, Progressing   Goal Outcome Evaluation:  Plan of Care Reviewed With: patient        Progress: improving  Outcome Evaluation: Patient has been pleasant and cooperative during shift. No complaints of pain, nausea or SOA. CCP called and they will discuss rehab/HH today or tomorrow. ID consulted for ESBL UTI and recommended bactrim PO. HTN. AOx4, assist x1, room air, SR. BSC. Will continue to monitor and assist patient as needed.

## 2023-11-12 NOTE — PROGRESS NOTES
Name: Sammi Curtis ADMIT: 2023   : 1945  PCP: Dary Mcbride APRN    MRN: [id number] LOS: 0 days   AGE/SEX: 78 y.o. female  ROOM: [facility]     Subjective     Unfortunately her urine culture grew ESBL E. coli, resistant to levofloxacin.  Patient has no new complaints.  Her biggest concern this morning was whether or not she would be able to shower.    Objective   Objective   Vital Signs  Temp:  [97.7 °F (36.5 °C)-99.1 °F (37.3 °C)] 97.8 °F (36.6 °C)  Heart Rate:  [57-67] 67  Resp:  [18] 18  BP: (116-185)/(51-91) 185/88  SpO2:  [95 %-98 %] 98 %  on   ;   Device (Oxygen Therapy): room air  Body mass index is 29.63 kg/m².  Physical Exam    General: Alert and oriented x3, no acute distress  HEENT: Normocephalic, atraumatic  CV: Regular rate and rhythm, no murmurs rubs or gallops  Lungs: Clear to auscultation bilaterally, no crackles or wheezes  Abdomen: Soft, nontender, nondistended  Neuro: CN II-XII intact, no FND appreciated     Exam reviewed and updated on 2023    Results Review     I reviewed the patient's new clinical results.  Results from last 7 days   Lab Units 23  0817 11/10/23  0642 23  1752   WBC 10*3/mm3 13.30* 16.58* 20.12*   HEMOGLOBIN g/dL 11.0* 10.5* 11.9*   PLATELETS 10*3/mm3 306 287 341     Results from last 7 days   Lab Units 23  2105 23  0817 11/10/23  0642 23  1944   SODIUM mmol/L  --  142 143 142   POTASSIUM mmol/L 4.7 3.6 3.1* 2.8*   CHLORIDE mmol/L  --  111* 109* 106   CO2 mmol/L  --  21.8* 24.4 24.0   BUN mg/dL  --  25* 35* 45*   CREATININE mg/dL  --  0.77 1.01* 1.12*   GLUCOSE mg/dL  --  106* 107* 93   Estimated Creatinine Clearance: 56.6 mL/min (by C-G formula based on SCr of 0.77 mg/dL).  Results from last 7 days   Lab Units 23  0817 11/10/23  0642 23  1944   ALBUMIN g/dL 2.8* 2.9* 3.3*   BILIRUBIN mg/dL 0.5 0.4 0.5   ALK PHOS U/L 127* 144* 166*   AST (SGOT) U/L 112* 115* 82*   ALT (SGPT) U/L 83* 74* 56*     Results from  "last 7 days   Lab Units 11/11/23  0817 11/10/23  0642 11/09/23  1944   CALCIUM mg/dL 9.4 9.0 9.6   ALBUMIN g/dL 2.8* 2.9* 3.3*   MAGNESIUM mg/dL  --  2.1 1.9     Results from last 7 days   Lab Units 11/09/23 1944   LACTATE mmol/L 1.3   No results found for: \"COVID19\"  No results found for: \"HGBA1C\", \"POCGLU\"  Results for orders placed or performed during the hospital encounter of 11/09/23   Blood Culture - Blood, Arm, Right    Specimen: Arm, Right; Blood   Result Value Ref Range    Blood Culture No growth at 2 days          CT Head Without Contrast, CT Cervical Spine Without Contrast  Narrative: CT HEAD AND CERVICAL SPINE WITHOUT CONTRAST     CLINICAL HISTORY: [Fall]     TECHNIQUE: CT scan of the head and cervical spine was obtained with  axial soft tissue and bone algorithm images. No intravenous contrast was  administered. Sagittal and coronal reconstructions were obtained.     COMPARISON: [None available]     FINDINGS:  No intracranial hemorrhage.  No midline shift or mass effect.   No CT evidence of acute territorial infarction.  No extraaxial  collection.  No hydrocephalus.  Clear visualized portions of the  paranasal sinuses and mastoid air cells.     Straightening of the normal cervical lordosis with minimal C3 on C4  anterolisthesis and minimal C5 on C6 retrolisthesis. Multilevel disc  degeneration, moderate at C4-C7. Multilevel facet arthropathy, moderate  bilaterally at C2-C6. Likely mild spinal canal stenosis at C4-C7  secondary to posterior disc osteophyte complexes. No prevertebral soft  tissue swelling.        Impression: 1. No acute intracranial abnormality.  2. No cervical spine fracture or traumatic subluxation. Degenerative  changes as above.              Radiation dose reduction techniques were utilized, including automated  exposure control and exposure modulation based on body size.     This report was finalized on 11/9/2023 7:13 PM by Dr. Rashad Quintero M.D on Workstation: [id number]     XR " Chest 1 View  Narrative: Portable chest radiograph     HISTORY: Weakness     TECHNIQUE: Single AP portable radiograph of the chest     COMPARISON: None     Impression: FINDINGS AND IMPRESSION:  Minimal atelectasis and or pleural parenchymal scarring is present  within the bilateral lung bases. No pneumothorax or pleural effusion is  seen. Cardiac silhouette is within normal its for size. Mild asymmetric  elevation of the right ami diaphragm.     This report was finalized on 11/9/2023 7:09 PM by Dr. Davis Sanchez M.D  on Workstation: [id number]       Scheduled Medications  enoxaparin, 40 mg, Subcutaneous, Q24H  levothyroxine, 112 mcg, Oral, Q AM  losartan, 25 mg, Oral, Daily  senna-docusate sodium, 2 tablet, Oral, BID  sertraline, 50 mg, Oral, Daily  sodium chloride, 10 mL, Intravenous, Q12H  sulfamethoxazole-trimethoprim, 1 tablet, Oral, Q12H    Infusions  sodium chloride, 75 mL/hr, Last Rate: Stopped (11/10/23 1013)    Diet  Diet: Regular/House Diet; Texture: Regular Texture (IDDSI 7); Fluid Consistency: Thin (IDDSI 0)       Assessment/Plan     Active Hospital Problems    Diagnosis  POA    **Acute UTI [N39.0]  Yes      Resolved Hospital Problems   No resolved problems to display.       78 y.o. female admitted with Acute UTI.    Acute urinary tract infection  -Initially on rocephin however LFTs worsening. DC and start levaquin   -Urine culture with ESBL E. coli.  She was previously on levofloxacin which the bacteria is actually resistant to.  Infectious diseases following.  She has been started on Bactrim 1 tablet double strength twice daily for 7 days.    Generalized weakness  Fall  PT/OT evaluation management     Hypokalemia  Replace as needed  Check magnesium     Transaminitis  AST and ALT are slightly elevated.  She was on atorvastatin which will be discontinued.  Ceftriaxone can also be implicated in cholestasis with some possible elevated LFTs initially so transitioned to Levaquin.  Check hepatitis  panel-negative  Denies alcohol use  Follow closely     SCDs for DVT prophylaxis.  Full code.  Discussed with patient and nursing staff.  Anticipate discharge to SNF when arrangements have been made.  We will have CCP reevaluate tomorrow      Tacos Arreaga MD  [facility]  11/12/23  12:00 EST

## 2023-11-12 NOTE — PLAN OF CARE
Goal Outcome Evaluation:      Pt resting in bed. No c/o voiced. VSS. No s/s of distress.

## 2023-11-13 LAB
ALBUMIN SERPL-MCNC: 2.8 G/DL (ref 3.5–5.2)
ALBUMIN/GLOB SERPL: 0.8 G/DL
ALP SERPL-CCNC: 109 U/L (ref 39–117)
ALT SERPL W P-5'-P-CCNC: 47 U/L (ref 1–33)
ANION GAP SERPL CALCULATED.3IONS-SCNC: 7.3 MMOL/L (ref 5–15)
AST SERPL-CCNC: 50 U/L (ref 1–32)
BILIRUB SERPL-MCNC: 0.5 MG/DL (ref 0–1.2)
BUN SERPL-MCNC: 11 MG/DL (ref 8–23)
BUN/CREAT SERPL: 14.7 (ref 7–25)
CALCIUM SPEC-SCNC: 8.9 MG/DL (ref 8.6–10.5)
CHLORIDE SERPL-SCNC: 104 MMOL/L (ref 98–107)
CK SERPL-CCNC: 40 U/L (ref 20–180)
CO2 SERPL-SCNC: 25.7 MMOL/L (ref 22–29)
CREAT SERPL-MCNC: 0.75 MG/DL (ref 0.57–1)
DEPRECATED RDW RBC AUTO: 40.1 FL (ref 37–54)
EGFRCR SERPLBLD CKD-EPI 2021: 81.6 ML/MIN/1.73
ERYTHROCYTE [DISTWIDTH] IN BLOOD BY AUTOMATED COUNT: 12.4 % (ref 12.3–15.4)
GLOBULIN UR ELPH-MCNC: 3.5 GM/DL
GLUCOSE SERPL-MCNC: 91 MG/DL (ref 65–99)
HCT VFR BLD AUTO: 33.4 % (ref 34–46.6)
HGB BLD-MCNC: 11.1 G/DL (ref 12–15.9)
MAGNESIUM SERPL-MCNC: 1.6 MG/DL (ref 1.6–2.4)
MCH RBC QN AUTO: 29.7 PG (ref 26.6–33)
MCHC RBC AUTO-ENTMCNC: 33.2 G/DL (ref 31.5–35.7)
MCV RBC AUTO: 89.3 FL (ref 79–97)
PLATELET # BLD AUTO: 311 10*3/MM3 (ref 140–450)
PMV BLD AUTO: 11.5 FL (ref 6–12)
POTASSIUM SERPL-SCNC: 3.7 MMOL/L (ref 3.5–5.2)
PROT SERPL-MCNC: 6.3 G/DL (ref 6–8.5)
RBC # BLD AUTO: 3.74 10*6/MM3 (ref 3.77–5.28)
SODIUM SERPL-SCNC: 137 MMOL/L (ref 136–145)
TSH SERPL DL<=0.05 MIU/L-ACNC: 3.25 UIU/ML (ref 0.27–4.2)
WBC NRBC COR # BLD: 14.3 10*3/MM3 (ref 3.4–10.8)

## 2023-11-13 PROCEDURE — 85027 COMPLETE CBC AUTOMATED: CPT | Performed by: STUDENT IN AN ORGANIZED HEALTH CARE EDUCATION/TRAINING PROGRAM

## 2023-11-13 PROCEDURE — 96375 TX/PRO/DX INJ NEW DRUG ADDON: CPT

## 2023-11-13 PROCEDURE — 82550 ASSAY OF CK (CPK): CPT | Performed by: INTERNAL MEDICINE

## 2023-11-13 PROCEDURE — G0378 HOSPITAL OBSERVATION PER HR: HCPCS

## 2023-11-13 PROCEDURE — 63710000001 ONDANSETRON PER 8 MG: Performed by: INTERNAL MEDICINE

## 2023-11-13 PROCEDURE — 96372 THER/PROPH/DIAG INJ SC/IM: CPT

## 2023-11-13 PROCEDURE — 25010000002 ENOXAPARIN PER 10 MG: Performed by: STUDENT IN AN ORGANIZED HEALTH CARE EDUCATION/TRAINING PROGRAM

## 2023-11-13 PROCEDURE — 80053 COMPREHEN METABOLIC PANEL: CPT | Performed by: STUDENT IN AN ORGANIZED HEALTH CARE EDUCATION/TRAINING PROGRAM

## 2023-11-13 PROCEDURE — 84443 ASSAY THYROID STIM HORMONE: CPT | Performed by: INTERNAL MEDICINE

## 2023-11-13 PROCEDURE — 25010000002 ONDANSETRON PER 1 MG: Performed by: INTERNAL MEDICINE

## 2023-11-13 PROCEDURE — 83735 ASSAY OF MAGNESIUM: CPT | Performed by: INTERNAL MEDICINE

## 2023-11-13 RX ORDER — ONDANSETRON 2 MG/ML
4 INJECTION INTRAMUSCULAR; INTRAVENOUS EVERY 6 HOURS PRN
Status: DISCONTINUED | OUTPATIENT
Start: 2023-11-13 | End: 2023-11-16 | Stop reason: HOSPADM

## 2023-11-13 RX ORDER — ONDANSETRON 4 MG/1
4 TABLET, FILM COATED ORAL EVERY 6 HOURS PRN
Status: DISCONTINUED | OUTPATIENT
Start: 2023-11-13 | End: 2023-11-16 | Stop reason: HOSPADM

## 2023-11-13 RX ADMIN — ONDANSETRON HYDROCHLORIDE 4 MG: 4 TABLET, FILM COATED ORAL at 16:38

## 2023-11-13 RX ADMIN — ANTACID TABLETS 2 TABLET: 500 TABLET, CHEWABLE ORAL at 14:13

## 2023-11-13 RX ADMIN — SULFAMETHOXAZOLE AND TRIMETHOPRIM 1 TABLET: 800; 160 TABLET ORAL at 20:09

## 2023-11-13 RX ADMIN — SERTRALINE 50 MG: 50 TABLET, FILM COATED ORAL at 09:42

## 2023-11-13 RX ADMIN — ONDANSETRON 4 MG: 2 INJECTION INTRAMUSCULAR; INTRAVENOUS at 16:29

## 2023-11-13 RX ADMIN — Medication 10 ML: at 20:10

## 2023-11-13 RX ADMIN — LOSARTAN POTASSIUM 25 MG: 25 TABLET, FILM COATED ORAL at 09:42

## 2023-11-13 RX ADMIN — LEVOTHYROXINE SODIUM 112 MCG: 112 TABLET ORAL at 09:42

## 2023-11-13 RX ADMIN — ANTACID TABLETS 2 TABLET: 500 TABLET, CHEWABLE ORAL at 21:44

## 2023-11-13 RX ADMIN — Medication 10 ML: at 09:43

## 2023-11-13 RX ADMIN — SULFAMETHOXAZOLE AND TRIMETHOPRIM 1 TABLET: 800; 160 TABLET ORAL at 09:42

## 2023-11-13 RX ADMIN — ENOXAPARIN SODIUM 40 MG: 100 INJECTION SUBCUTANEOUS at 14:13

## 2023-11-13 NOTE — DISCHARGE PLACEMENT REQUEST
"Sammi Curtis (78 y.o. Female)       Date of Birth   1945    Social Security Number       Address   [address] [place] [address]    Home Phone   [phone]    MRN   [id number]       [facility]    Marital Status                               Admission Date   11/9/23    Admission Type   Emergency    Admitting Provider   Tacos Arreaga MD    Attending Provider   Carolyn Michel MD    Department, Room/Bed   [address], [address]       Discharge Date       Discharge Disposition       Discharge Destination                                 Attending Provider: Carolyn Michel MD    Allergies: No Known Allergies    Isolation: Contact   Infection: ESBL E coli (11/12/23)   Code Status: CPR    Ht: 157.5 cm (62\")   Wt: 73.5 kg (162 lb)    Admission Cmt: None   Principal Problem: Acute UTI [N39.0]                   Active Insurance as of 11/9/2023       Primary Coverage       Payor Plan Insurance Group Employer/Plan Group    [employer] [place] MEDICARE REPLACEMENT [facility] MEDICARE REPLACEMENT        Payor Plan Address Payor Plan Phone Number Payor Plan Fax Number Effective Dates    [address] [address] [phone]  11/9/2023 - None Entered    [facility] [id number]         Subscriber Name Subscriber Birth Date Member ID       SAMMI CURTIS 1945 [id number]                     Emergency Contacts        (Rel.) Home Phone Work Phone Mobile Phone    LUCIANA DEL CID (Daughter) -- -- [phone]                "

## 2023-11-13 NOTE — SIGNIFICANT NOTE
11/13/23 1435   OTHER   Discipline physical therapist   Rehab Time/Intention   Session Not Performed patient/family declined treatment  (Pt deferred therapy secondary to nausea. Notified RN.Will follow up tomorrow)   Recommendation   PT - Next Appointment 11/14/23

## 2023-11-13 NOTE — PROGRESS NOTES
Continued Stay Note  [facility]     Patient Name: Sammi Curtis  MRN: [id number]  Today's Date: 11/13/2023    Admit Date: 11/9/2023    Plan: SNF - referrals pending, will need pre-cert   Discharge Plan       Row Name 11/13/23 1636       Plan    Plan SNF - referrals pending, will need pre-cert    Patient/Family in Agreement with Plan yes    Plan Comments Spoke with patient and daughter Daylin [phone] at bedside.  They are aware that patient will need SNF at [place].  [facility] and list of SNF by area.  They would like referral to [facility] - email to Miles, and to [facility] - email to Ariana.  Will need pre-cert.  CCP following.  Sedrick REZA                                  Expected Discharge Date and Time       Expected Discharge Date Expected Discharge Time    Nov 13, 2023               Becky S. [place], RN

## 2023-11-13 NOTE — PROGRESS NOTES
Name: Sammi Curtis ADMIT: 2023   : 1945  PCP: Dary Mcbride APRN    MRN: [id number] LOS: 0 days   AGE/SEX: 78 y.o. female  ROOM: [facility]     Subjective   Subjective   Continues with weakness.  Decreased lower abdominal pain.  No dysuria or hematuria.  No fever or chills.  No nausea or vomiting.    Review of Systems  Vascular/respiratory.  No chest pain/no palpitations/no shortness of breath     Objective   Objective   Vital Signs  Temp:  [98.4 °F (36.9 °C)-98.8 °F (37.1 °C)] 98.4 °F (36.9 °C)  Heart Rate:  [60-62] 60  Resp:  [16-18] 16  BP: (129-163)/(70-94) 132/70  SpO2:  [97 %-98 %] 97 %  on   ;   Device (Oxygen Therapy): room air    Intake/Output Summary (Last 24 hours) at 2023 1443  Last data filed at 2023 0900  Gross per 24 hour   Intake 480 ml   Output 300 ml   Net 180 ml     Body mass index is 29.63 kg/m².      23  1652   Weight: 73.5 kg (162 lb)     Physical Exam  General.  Elderly female.  Alert and oriented x4.  In no apparent pain/distress/diaphoresis.  Normal mood and affect  Eyes.  Pupils equal round and reactive.  Intact extraocular musculature.  No pallor or jaundice.  Oral cavity.  Moist mucous membrane.  Neck.  Supple.  No JVD.  No lymphadenopathy or thyromegaly.  Cardiovascular.  Regular rate and rhythm grade 2 systolic murmur.  Chest.  Clear to auscultation bilaterally with no added sounds.  .  No CVA tenderness.  Abdomen.  Soft lax.  No tenderness.  No organomegaly.  No guarding or rebound.  Extremities.  No clubbing/cyanosis/edema.    Results Review:      Results from last 7 days   Lab Units 23  0654 23  1527 23  2105 23  0817 11/10/23  0642 23  1944   SODIUM mmol/L 137 138  --  142 143 142   POTASSIUM mmol/L 3.7 4.0 4.7 3.6 3.1* 2.8*   CHLORIDE mmol/L 104 106  --  111* 109* 106   CO2 mmol/L 25.7 23.7  --  21.8* 24.4 24.0   BUN mg/dL 11 14  --  25* 35* 45*   CREATININE mg/dL 0.75 0.67  --  0.77 1.01* 1.12*   GLUCOSE mg/dL  "91 107*  --  106* 107* 93   CALCIUM mg/dL 8.9 9.0  --  9.4 9.0 9.6   AST (SGOT) U/L 50* 61*  --  112* 115* 82*   ALT (SGPT) U/L 47* 55*  --  83* 74* 56*     Estimated Creatinine Clearance: 58.1 mL/min (by C-G formula based on SCr of 0.75 mg/dL).          Results from last 7 days   Lab Units 11/09/23 1944   CK TOTAL U/L 95             Results from last 7 days   Lab Units 11/10/23  0642 11/09/23 1944   MAGNESIUM mg/dL 2.1 1.9           Invalid input(s): \"LDLCALC\"  Results from last 7 days   Lab Units 11/13/23  0655 11/12/23  1527 11/11/23  0817 11/10/23  0642 11/09/23  1752   WBC 10*3/mm3 14.30* 15.27* 13.30* 16.58* 20.12*   HEMOGLOBIN g/dL 11.1* 10.4* 11.0* 10.5* 11.9*   HEMATOCRIT % 33.4* 32.3* 33.1* 30.9* 35.2   PLATELETS 10*3/mm3 311 298 306 287 341   MCV fL 89.3 92.3 90.4 89.0 88.4   MCH pg 29.7 29.7 30.1 30.3 29.9   MCHC g/dL 33.2 32.2 33.2 34.0 33.8   RDW % 12.4 12.9 12.5 12.8 12.5   RDW-SD fl 40.1 43.6 41.6 41.5 40.8   MPV fL 11.5 10.8 11.0 10.9 11.2   NEUTROPHIL % %  --   --   --   --  77.3*   LYMPHOCYTE % %  --   --   --   --  7.7*   MONOCYTES % %  --   --   --   --  14.1*   EOSINOPHIL % %  --   --   --   --  0.0*   BASOPHIL % %  --   --   --   --  0.1   IMM GRAN % %  --   --   --   --  0.8*   NEUTROS ABS 10*3/mm3  --   --   --   --  15.55*   LYMPHS ABS 10*3/mm3  --   --   --   --  1.55   MONOS ABS 10*3/mm3  --   --   --   --  2.83*   EOS ABS 10*3/mm3  --   --   --   --  0.00   BASOS ABS 10*3/mm3  --   --   --   --  0.03   IMMATURE GRANS (ABS) 10*3/mm3  --   --   --   --  0.16*   NRBC /100 WBC  --   --   --   --  0.0             Results from last 7 days   Lab Units 11/09/23 1944   LACTATE mmol/L 1.3             Results from last 7 days   Lab Units 11/1945 11/09/23 1944 11/09/23 1855   BLOODCX  No growth at 3 days No growth at 3 days  --    URINECX   --   --  >100,000 CFU/mL Escherichia coli ESBL*         Results from last 7 days   Lab Units 11/09/23 1855   NITRITE UA  Positive*   WBC UA /HPF Too " Numerous to Count*   BACTERIA UA /HPF 4+*   SQUAM EPITHEL UA /HPF 3-6*   URINECX  >100,000 CFU/mL Escherichia coli ESBL*           Imaging:  Imaging Results (Last 24 Hours)       ** No results found for the last 24 hours. **               I reviewed the patient's new clinical results / labs / tests / procedures      Assessment/Plan     Active Hospital Problems    Diagnosis  POA    **Acute UTI [N39.0]  Yes      Resolved Hospital Problems   No resolved problems to display.           ESBL E. coli UTI.  Status post IV Rocephin and IV Levaquin.  Currently on p.o. Bactrim per infectious disease.  Blood cultures are negative.  Weakness is history of falls.  On physical therapy.  Check TSH/CK /magnesium.  Potassium is normal.  PT recommends skilled.  Elevated liver function test.  Mostly secondary to medications.  Improving after stopping Lipitor.  Anemia.  Hemoglobin is stable.  Continue to monitor.  Hypothyroidism.  Continue current replacement and check TSH.  Hypertension.  Good control with no evidence of angina or congestive heart failure on Cozaar.  Continue the same.  VTE prophylaxis.  Patient on Lovenox.      Discussed my findings and plan of treatment with the patient.  Disposition.  To skilled facility once arrangement has been made.      Carolyn Michel MD  [facility]  11/13/23  14:43 EST

## 2023-11-13 NOTE — PLAN OF CARE
Goal Outcome Evaluation:  Plan of Care Reviewed With: patient        Progress: no change  Outcome Evaluation: Pt c/o nausea this shift, Zofran po given once, and is effective, denies pain, or sob, safety precautions in place, sr on tele. , room air, ivf d/c today. eating and drinking adequately. nad, vss
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
## 2023-11-14 PROBLEM — R53.1 WEAKNESS: Status: ACTIVE | Noted: 2023-11-14

## 2023-11-14 PROBLEM — E03.9 HYPOTHYROIDISM (ACQUIRED): Status: ACTIVE | Noted: 2023-11-14

## 2023-11-14 PROBLEM — I10 ESSENTIAL HYPERTENSION: Status: ACTIVE | Noted: 2023-11-14

## 2023-11-14 PROBLEM — D64.9 ANEMIA: Status: ACTIVE | Noted: 2023-11-14

## 2023-11-14 PROBLEM — D72.829 LEUKOCYTOSIS: Status: ACTIVE | Noted: 2023-11-14

## 2023-11-14 LAB
ALBUMIN SERPL-MCNC: 2.8 G/DL (ref 3.5–5.2)
ALP SERPL-CCNC: 109 U/L (ref 39–117)
ALT SERPL W P-5'-P-CCNC: 41 U/L (ref 1–33)
ANION GAP SERPL CALCULATED.3IONS-SCNC: 7.7 MMOL/L (ref 5–15)
AST SERPL-CCNC: 46 U/L (ref 1–32)
BACTERIA SPEC AEROBE CULT: NORMAL
BACTERIA SPEC AEROBE CULT: NORMAL
BASOPHILS # BLD AUTO: 0.11 10*3/MM3 (ref 0–0.2)
BASOPHILS NFR BLD AUTO: 0.6 % (ref 0–1.5)
BILIRUB CONJ SERPL-MCNC: <0.2 MG/DL (ref 0–0.3)
BILIRUB INDIRECT SERPL-MCNC: ABNORMAL MG/DL
BILIRUB SERPL-MCNC: 0.4 MG/DL (ref 0–1.2)
BUN SERPL-MCNC: 11 MG/DL (ref 8–23)
BUN/CREAT SERPL: 12.5 (ref 7–25)
CALCIUM SPEC-SCNC: 9.2 MG/DL (ref 8.6–10.5)
CHLORIDE SERPL-SCNC: 103 MMOL/L (ref 98–107)
CO2 SERPL-SCNC: 24.3 MMOL/L (ref 22–29)
CREAT SERPL-MCNC: 0.88 MG/DL (ref 0.57–1)
DEPRECATED RDW RBC AUTO: 42.9 FL (ref 37–54)
EGFRCR SERPLBLD CKD-EPI 2021: 67.4 ML/MIN/1.73
EOSINOPHIL # BLD AUTO: 0.32 10*3/MM3 (ref 0–0.4)
EOSINOPHIL NFR BLD AUTO: 1.8 % (ref 0.3–6.2)
ERYTHROCYTE [DISTWIDTH] IN BLOOD BY AUTOMATED COUNT: 12.9 % (ref 12.3–15.4)
GLUCOSE SERPL-MCNC: 93 MG/DL (ref 65–99)
HCT VFR BLD AUTO: 34.9 % (ref 34–46.6)
HGB BLD-MCNC: 11.4 G/DL (ref 12–15.9)
LYMPHOCYTES # BLD AUTO: 4.26 10*3/MM3 (ref 0.7–3.1)
LYMPHOCYTES NFR BLD AUTO: 24.3 % (ref 19.6–45.3)
MCH RBC QN AUTO: 29.8 PG (ref 26.6–33)
MCHC RBC AUTO-ENTMCNC: 32.7 G/DL (ref 31.5–35.7)
MCV RBC AUTO: 91.4 FL (ref 79–97)
MONOCYTES # BLD AUTO: 1.76 10*3/MM3 (ref 0.1–0.9)
MONOCYTES NFR BLD AUTO: 10 % (ref 5–12)
NEUTROPHILS NFR BLD AUTO: 10.08 10*3/MM3 (ref 1.7–7)
NEUTROPHILS NFR BLD AUTO: 57.5 % (ref 42.7–76)
PLATELET # BLD AUTO: 342 10*3/MM3 (ref 140–450)
PMV BLD AUTO: 11 FL (ref 6–12)
POTASSIUM SERPL-SCNC: 3.9 MMOL/L (ref 3.5–5.2)
PROCALCITONIN SERPL-MCNC: 0.05 NG/ML (ref 0–0.25)
PROT SERPL-MCNC: 6.7 G/DL (ref 6–8.5)
RBC # BLD AUTO: 3.82 10*6/MM3 (ref 3.77–5.28)
SODIUM SERPL-SCNC: 135 MMOL/L (ref 136–145)
WBC NRBC COR # BLD: 17.55 10*3/MM3 (ref 3.4–10.8)

## 2023-11-14 PROCEDURE — 97110 THERAPEUTIC EXERCISES: CPT

## 2023-11-14 PROCEDURE — 80076 HEPATIC FUNCTION PANEL: CPT | Performed by: INTERNAL MEDICINE

## 2023-11-14 PROCEDURE — 84145 PROCALCITONIN (PCT): CPT | Performed by: INTERNAL MEDICINE

## 2023-11-14 PROCEDURE — G0378 HOSPITAL OBSERVATION PER HR: HCPCS

## 2023-11-14 PROCEDURE — 96372 THER/PROPH/DIAG INJ SC/IM: CPT

## 2023-11-14 PROCEDURE — 80048 BASIC METABOLIC PNL TOTAL CA: CPT | Performed by: INTERNAL MEDICINE

## 2023-11-14 PROCEDURE — 85025 COMPLETE CBC W/AUTO DIFF WBC: CPT | Performed by: INTERNAL MEDICINE

## 2023-11-14 PROCEDURE — 97530 THERAPEUTIC ACTIVITIES: CPT

## 2023-11-14 PROCEDURE — 25010000002 ENOXAPARIN PER 10 MG: Performed by: STUDENT IN AN ORGANIZED HEALTH CARE EDUCATION/TRAINING PROGRAM

## 2023-11-14 RX ADMIN — ENOXAPARIN SODIUM 40 MG: 100 INJECTION SUBCUTANEOUS at 16:32

## 2023-11-14 RX ADMIN — SULFAMETHOXAZOLE AND TRIMETHOPRIM 1 TABLET: 800; 160 TABLET ORAL at 08:17

## 2023-11-14 RX ADMIN — ANTACID TABLETS 2 TABLET: 500 TABLET, CHEWABLE ORAL at 06:36

## 2023-11-14 RX ADMIN — DOCUSATE SODIUM 50MG AND SENNOSIDES 8.6MG 2 TABLET: 8.6; 5 TABLET, FILM COATED ORAL at 08:16

## 2023-11-14 RX ADMIN — DOCUSATE SODIUM 50MG AND SENNOSIDES 8.6MG 2 TABLET: 8.6; 5 TABLET, FILM COATED ORAL at 20:46

## 2023-11-14 RX ADMIN — LOSARTAN POTASSIUM 25 MG: 25 TABLET, FILM COATED ORAL at 08:16

## 2023-11-14 RX ADMIN — Medication 10 ML: at 08:17

## 2023-11-14 RX ADMIN — LEVOTHYROXINE SODIUM 112 MCG: 112 TABLET ORAL at 06:36

## 2023-11-14 RX ADMIN — SERTRALINE 50 MG: 50 TABLET, FILM COATED ORAL at 08:16

## 2023-11-14 RX ADMIN — SULFAMETHOXAZOLE AND TRIMETHOPRIM 1 TABLET: 800; 160 TABLET ORAL at 20:46

## 2023-11-14 RX ADMIN — Medication 10 ML: at 20:47

## 2023-11-14 NOTE — PLAN OF CARE
"Goal Outcome Evaluation:  Plan of Care Reviewed With: (P) patient           Outcome Evaluation: (P) Pt was eager to get out of bed and ambulate. She was anticipating pain in her back and DARIANA knees while ambulating, but said she felt no pain while ambulating and stated \"it feels good\". Pt was able to ambulate 140' in [place] w CGA. VC were given to widen her stance and look straight ahead. Pt c/o groin pain when she widens her stance. Pt requested to brush her teeth at sink, CGA w Rwx. Pt performed dynamic sit and reach exercise at EOB with no LOB and other seated ther ex. Pt was happy w todays treatment outcome and was eager to rest in bed.         "

## 2023-11-14 NOTE — PLAN OF CARE
Goal Outcome Evaluation:  Plan of Care Reviewed With: patient        Progress: no change  Outcome Evaluation: Pt amb in room and hw with PT today, very pleased and happy with her ambulation today, sat in chair also this am, pt voiding and eating well, family at bsd, no c/o nausea or abd discomfort, sr on tele, room air satting 90-94%, awaiting rehab placement and pre-cert.

## 2023-11-14 NOTE — PROGRESS NOTES
Name: Sammi Curtis ADMIT: 2023   : 1945  PCP: Dary Mcbride APRN    MRN: [id number] LOS: 0 days   AGE/SEX: 78 y.o. female  ROOM: [facility]     Subjective   Subjective   Patient reports that she cannot comment on her weakness as she has not gotten out of the bed today yesterday resolved abdominal pain.  No dysuria or hematuria.  No fever or chills.  No nausea or vomiting.    .Review of Systems  Cardiology vascular/respiratory.  No chest pain/no palpitations/no shortness of breath     Objective   Objective   Vital Signs  Temp:  [97 °F (36.1 °C)-98.4 °F (36.9 °C)] 98.1 °F (36.7 °C)  Heart Rate:  [65-71] 65  Resp:  [16] 16  BP: (132-153)/(70-86) 153/82  SpO2:  [92 %-94 %] 92 %  on   ;   Device (Oxygen Therapy): room air    Intake/Output Summary (Last 24 hours) at 2023 1250  Last data filed at 2023 1000  Gross per 24 hour   Intake 720 ml   Output 1950 ml   Net -1230 ml     Body mass index is 29.63 kg/m².      23  1652   Weight: 73.5 kg (162 lb)     Physical Exam  General.  Elderly female.  Alert and oriented x4.  In no apparent pain/distress/diaphoresis.  Normal mood and affect  Eyes.  Pupils equal round and reactive.  Intact extraocular musculature.  No pallor or jaundice.  Oral cavity.  Moist mucous membrane.  Neck.  Supple.  No JVD.  No lymphadenopathy or thyromegaly.  Cardiovascular.  Regular rate and rhythm grade 2 systolic murmur.  Chest.  Clear to auscultation bilaterally with no added sounds.  .  No CVA tenderness.  Abdomen.  Soft lax.  No tenderness.  No organomegaly.  No guarding or rebound.  Extremities.  No clubbing/cyanosis.  Trace bilateral lower extremity edema.    Results Review:      Results from last 7 days   Lab Units 23  0702 23  0654 23  1527 23  2105 23  0817 11/10/23  0642 23  1944   SODIUM mmol/L 135* 137 138  --  142 143 142   POTASSIUM mmol/L 3.9 3.7 4.0 4.7 3.6 3.1* 2.8*   CHLORIDE mmol/L 103 104 106  --  111* 109*  "106   CO2 mmol/L 24.3 25.7 23.7  --  21.8* 24.4 24.0   BUN mg/dL 11 11 14  --  25* 35* 45*   CREATININE mg/dL 0.88 0.75 0.67  --  0.77 1.01* 1.12*   GLUCOSE mg/dL 93 91 107*  --  106* 107* 93   CALCIUM mg/dL 9.2 8.9 9.0  --  9.4 9.0 9.6   AST (SGOT) U/L 46* 50* 61*  --  112* 115* 82*   ALT (SGPT) U/L 41* 47* 55*  --  83* 74* 56*     Estimated Creatinine Clearance: 49.5 mL/min (by C-G formula based on SCr of 0.88 mg/dL).          Results from last 7 days   Lab Units 11/13/23  1529 11/09/23  1944   CK TOTAL U/L 40 95         Results from last 7 days   Lab Units 11/13/23  1529   TSH uIU/mL 3.250     Results from last 7 days   Lab Units 11/13/23  1529 11/10/23  0642 11/09/23  1944   MAGNESIUM mg/dL 1.6 2.1 1.9           Invalid input(s): \"LDLCALC\"  Results from last 7 days   Lab Units 11/14/23  0702 11/13/23  0655 11/12/23  1527 11/11/23  0817 11/10/23  0642 11/09/23  1752   WBC 10*3/mm3 17.55* 14.30* 15.27* 13.30* 16.58* 20.12*   HEMOGLOBIN g/dL 11.4* 11.1* 10.4* 11.0* 10.5* 11.9*   HEMATOCRIT % 34.9 33.4* 32.3* 33.1* 30.9* 35.2   PLATELETS 10*3/mm3 342 311 298 306 287 341   MCV fL 91.4 89.3 92.3 90.4 89.0 88.4   MCH pg 29.8 29.7 29.7 30.1 30.3 29.9   MCHC g/dL 32.7 33.2 32.2 33.2 34.0 33.8   RDW % 12.9 12.4 12.9 12.5 12.8 12.5   RDW-SD fl 42.9 40.1 43.6 41.6 41.5 40.8   MPV fL 11.0 11.5 10.8 11.0 10.9 11.2   NEUTROPHIL % % 57.5  --   --   --   --  77.3*   LYMPHOCYTE % % 24.3  --   --   --   --  7.7*   MONOCYTES % % 10.0  --   --   --   --  14.1*   EOSINOPHIL % % 1.8  --   --   --   --  0.0*   BASOPHIL % % 0.6  --   --   --   --  0.1   IMM GRAN % %  --   --   --   --   --  0.8*   NEUTROS ABS 10*3/mm3 10.08*  --   --   --   --  15.55*   LYMPHS ABS 10*3/mm3 4.26*  --   --   --   --  1.55   MONOS ABS 10*3/mm3 1.76*  --   --   --   --  2.83*   EOS ABS 10*3/mm3 0.32  --   --   --   --  0.00   BASOS ABS 10*3/mm3 0.11  --   --   --   --  0.03   IMMATURE GRANS (ABS) 10*3/mm3  --   --   --   --   --  0.16*   NRBC /100 WBC  --   " --   --   --   --  0.0             Results from last 7 days   Lab Units 11/09/23 1944   LACTATE mmol/L 1.3             Results from last 7 days   Lab Units 11/1945 11/09/23 1944 11/09/23 1855   BLOODCX  No growth at 4 days No growth at 4 days  --    URINECX   --   --  >100,000 CFU/mL Escherichia coli ESBL*         Results from last 7 days   Lab Units 11/09/23 1855   NITRITE UA  Positive*   WBC UA /HPF Too Numerous to Count*   BACTERIA UA /HPF 4+*   SQUAM EPITHEL UA /HPF 3-6*   URINECX  >100,000 CFU/mL Escherichia coli ESBL*           Imaging:  Imaging Results (Last 24 Hours)       ** No results found for the last 24 hours. **               I reviewed the patient's new clinical results / labs / tests / procedures      Assessment/Plan     Active Hospital Problems    Diagnosis  POA    **Acute UTI [N39.0]  Yes    Leukocytosis [D72.829]  Yes    Weakness [R53.1]  Yes    Falls [W19.XXXA]  Yes    Essential hypertension [I10]  Yes    Anemia [D64.9]  Yes    Hypothyroidism (acquired) [E03.9]  Yes      Resolved Hospital Problems   No resolved problems to display.           ESBL E. coli UTI.  Status post IV Rocephin and IV Levaquin.  Currently on p.o. Bactrim per infectious disease.  Blood cultures are negative.  Weakness/history of falls.  On physical therapy.  Normal potassium/magnesium/TSH/CK.  PT recommends SNF.  CT scan of the brain without contrast revealed no acute intracranial abnormalities.  CT scan of the C-spine revealed no fractures.  Leukocytosis.  Worsening.  Negative blood cultures.  No fever.  Could be secondary to UTI and reactive.  Will check procalcitonin.  Monitor.  No change in the antibiotics.  Elevated liver function test.  Mostly secondary to medications.  Improving after stopping Lipitor.  Benign GI examination.  Anemia.  Hemoglobin is stable.  Continue to monitor.  Hypothyroidism.  Continue current replacement.  Normal TSH.  Hypertension.  Good control with no evidence of angina or congestive  heart failure on Cozaar.  Continue the same.  VTE prophylaxis.  Patient on Lovenox.      Discussed my findings and plan of treatment with the patient.  Disposition.  To skilled facility once arrangement has been made.      Carolyn Michel MD  [facility]  11/14/23  12:50 EST

## 2023-11-14 NOTE — THERAPY TREATMENT NOTE
Patient Name: Sammi Curtis  : 1945    MRN: [id number]                              Today's Date: 2023       Admit Date: 2023    Visit Dx:     ICD-10-CM ICD-9-CM   1. Generalized weakness  R53.1 780.79   2. Urinary tract infection without hematuria, site unspecified  N39.0 599.0   3. Hypokalemia  E87.6 276.8     Patient Active Problem List   Diagnosis    Acute UTI    Leukocytosis    Weakness    Falls    Essential hypertension    Anemia    Hypothyroidism (acquired)     Past Medical History:   Diagnosis Date    Arthritis     Disease of thyroid gland     Elevated cholesterol      History reviewed. No pertinent surgical history.   General Information       Row Name 23 1540          Physical Therapy Time and Intention    Document Type therapy note (daily note) (P)   -SOLITARIO     Mode of Treatment individual therapy;physical therapy (P)   -SOLITARIO       Row Name 23 1540          General Information    Patient Profile Reviewed yes (P)   -SOLITARIO               User Key  (r) = Recorded By, (t) = Taken By, (c) = Cosigned By      Initials Name Provider Type    Kurt Vargas PTA Student PTA Student                   Mobility       Row Name 23 1540          Bed Mobility    Bed Mobility supine-sit-supine;scooting/bridging (P)   -JY     Scooting/Bridging [place] (Bed Mobility) supervision (P)   -SOLITARIO     Supine-Sit-Supine [place] (Bed Mobility) supervision (P)   -SOLITARIO     Assistive Device (Bed Mobility) bed rails;head of bed elevated (P)   -CURTISY       Row Name 23 1540          Sit-Stand Transfer    Sit-Stand [place] (Transfers) minimum assist (75% patient effort);1 person assist (P)   -SOLITARIO     Assistive Device (Sit-Stand Transfers) walker, front-wheeled (P)   -JY       Row Name 23 1548          Gait/Stairs (Locomotion)    [place] Level (Gait) contact guard (P)   -JY     Assistive Device (Gait) walker, front-wheeled (P)   -JY     Patient was able to Ambulate yes (P)   -JY     Distance  in Feet (Gait) 130' (P)   -JY     Deviations/Abnormal Patterns (Gait) base of support, narrow (P)   -JY     Bilateral Gait Deviations forward flexed posture (P)   -JY     Comment, (Gait/Stairs) No rest breaks needed. Pt was eager to walk and said it felt good. Not painful in her back and knees like she expected. (P)   -JY               User Key  (r) = Recorded By, (t) = Taken By, (c) = Cosigned By      Initials Name Provider Type    Kurt Vargas, PTA Student PTA Student                   Obj/Interventions       Row Name 11/14/23 1544          Motor Skills    Therapeutic Exercise -- (P)   Seated EOB AROM [place], AP, LAQ 10 reps each  -JY       Row Name 11/14/23 1544          Balance    Balance Assessment sitting static balance;sitting dynamic balance;standing static balance;standing dynamic balance (P)   -JY     Static Sitting Balance supervision (P)   -JY     Dynamic Sitting Balance contact guard (P)   -JY     Position, Sitting Balance sitting edge of bed (P)   -JY     Static Standing Balance contact guard (P)   -JY     Dynamic Standing Balance contact guard (P)   -JY     Position/Device Used, Standing Balance walker, front-wheeled (P)   -JY     Balance Interventions sitting (P)   -JY     Comment, Balance Pt participated in sit and reach dynamic sitting exercise at EOB with no LOB. Pt stood at sink to brush her teeth w Rwx. (P)   -JY               User Key  (r) = Recorded By, (t) = Taken By, (c) = Cosigned By      Initials Name Provider Type    Kurt Vargas, PTA Student PTA Student                   Goals/Plan    No documentation.                  Clinical Impression       Row Name 11/14/23 1546          Pain    Additional Documentation Pain Scale: FACES Pre/Post-Treatment (Group) (P)   -JY       Row Name 11/14/23 8573          Pain Scale: FACES Pre/Post-Treatment    Pain: FACES Scale, Pretreatment 0-->no hurt (P)   -JY     Posttreatment Pain Rating 0-->no hurt (P)   -JY     Pain Location - groin (P)   -JY      "Pre/Posttreatment Pain Comment Pt had c/o groin pain, which she said affects her ability to widen her stance while ambulating. (P)   -SOLITARIO       Row Name 11/14/23 1547          Plan of Care Review    Plan of Care Reviewed With patient (P)   -SOLITARIO     Outcome Evaluation Pt was eager to get out of bed and ambulate. She was anticipating pain in her back and DARIANA knees while ambulating, but said she felt no pain while ambulating and stated \"it feels good\". Pt was able to ambulate 140' in lombardi w CGA. VC were given to widen her stance and look straight ahead. Pt c/o groin pain when she widens her stance. Pt requested to brush her teeth at sink, CGA w Rwx. Pt performed dynamic sit and reach exercise at EOB with no LOB and other seated ther ex. Pt was happy w todays treatment outcome and was eager to rest in bed. (P)   -SOLITARIO       Row Name 11/14/23 1547          Positioning and Restraints    Pre-Treatment Position in bed (P)   -JY     Post Treatment Position bed (P)   -CURTISY     In Bed notified nsg;fowlers;exit alarm on;encouraged to call for assist;call light within reach (P)   -JDELORIS               User Key  (r) = Recorded By, (t) = Taken By, (c) = Cosigned By      Initials Name Provider Type    Kurt Vargas, PTA Student PTA Student                   Outcome Measures       Row Name 11/14/23 1552 11/14/23 0800       How much help from another person do you currently need...    Turning from your back to your side while in flat bed without using bedrails? 4 (P)   -JY 3  -JS    Moving from lying on back to sitting on the side of a flat bed without bedrails? 4 (P)   -JY 3  -JS    Moving to and from a bed to a chair (including a wheelchair)? 3 (P)   -JY 2  -JS    Standing up from a chair using your arms (e.g., wheelchair, bedside chair)? 3 (P)   -JY 3  -JS    Climbing 3-5 steps with a railing? 2 (P)   -JY 2  -JS    To walk in hospital room? 3 (P)   -JY 2  -JS    AM-PAC 6 Clicks Score (PT) 19 (P)   -JY 15  -JS    Highest Level of Mobility " "Goal 6 --> Walk 10 steps or more (P)   -CURTISY 4 --> Transfer to chair/commode  -      Row Name 11/14/23 1552          Functional Assessment    Outcome Measure Options AM-PAC 6 Clicks Basic Mobility (PT) (P)   -SOLITARIO               User Key  (r) = Recorded By, (t) = Taken By, (c) = Cosigned By      Initials Name Provider Type     Vu Chisholm, RN Registered Nurse    Kurt Vargas, PTA Student PTA Student                                 Physical Therapy Education       Title: PT OT SLP Therapies (Done)       Topic: Physical Therapy (Done)       Point: Mobility training (Done)       Learning Progress Summary             Patient Eager, E, VU,DU by SOLITARIO at 11/14/2023 1552    Acceptance, E, VU,NR by  at 11/12/2023 1635                         Point: Home exercise program (Done)       Learning Progress Summary             Patient Eager, E, VU,DU by SOLITARIO at 11/14/2023 1552    Acceptance, E, VU,NR by  at 11/12/2023 1635                         Point: Body mechanics (Done)       Learning Progress Summary             Patient Eager, E, VU,DU by SOLITARIO at 11/14/2023 1552    Acceptance, E, VU,NR by  at 11/12/2023 1635                         Point: Precautions (Done)       Learning Progress Summary             Patient Eager, E, VU,DU by SOLITARIO at 11/14/2023 1552    Acceptance, E, VU,NR by  at 11/12/2023 1635                                         User Key       Initials Effective Dates Name Provider Type Discipline     07/11/23 -  Paty Nowak, PT Physical Therapist PT    SOLITARIO 11/08/23 -  Kurt Weaver, PTA Student PTA Student PT                  PT Recommendation and Plan     Plan of Care Reviewed With: (P) patient  Outcome Evaluation: (P) Pt was eager to get out of bed and ambulate. She was anticipating pain in her back and DARIANA knees while ambulating, but said she felt no pain while ambulating and stated \"it feels good\". Pt was able to ambulate 140' in lombardi w CGA. VC were given to widen her stance and look straight ahead. " Pt c/o groin pain when she widens her stance. Pt requested to brush her teeth at sink, CGA w Rwx. Pt performed dynamic sit and reach exercise at EOB with no LOB and other seated ther ex. Pt was happy w todays treatment outcome and was eager to rest in bed.     Time Calculation:         PT Charges       Row Name 11/14/23 1553             Time Calculation    Start Time 1504 (P)   -JY      Stop Time 1532 (P)   -JY      Time Calculation (min) 28 min (P)   -JY      PT Received On 11/14/23 (P)   -JY      PT - Next Appointment 11/15/23 (P)   -JY         Time Calculation- PT    Total Timed Code Minutes- PT 28 minute(s) (P)   -JY         Timed Charges    17497 - PT Therapeutic Exercise Minutes 8 (P)   -JY      12655 - PT Therapeutic Activity Minutes 20 (P)   -JY         Total Minutes    Timed Charges Total Minutes 28 (P)   -JY       Total Minutes 28 (P)   -JY                User Key  (r) = Recorded By, (t) = Taken By, (c) = Cosigned By      Initials Name Provider Type    Kurt Vargas PTA Student PTA Student                  Therapy Charges for Today       Code Description Service Date Service Provider Modifiers Qty    85907972125 HC PT THER PROC EA 15 MIN 11/14/2023 Kurt Weaver PTA Student GP 1    80254138675 HC PT THERAPEUTIC ACT EA 15 MIN 11/14/2023 Kurt Weaver PTA Student GP 1            PT G-Codes  Outcome Measure Options: (P) AM-PAC 6 Clicks Basic Mobility (PT)  AM-PAC 6 Clicks Score (PT): (P) 19  AM-PAC 6 Clicks Score (OT): 14       Kurt Weaver PTA Student  11/14/2023

## 2023-11-14 NOTE — PLAN OF CARE
Goal Outcome Evaluation:  Plan of Care Reviewed With: patient      Patient awake overnight.C/o heartburn, medicated per MAR.VSS.Room air.NSR

## 2023-11-14 NOTE — CASE MANAGEMENT/SOCIAL WORK
Continued Stay Note  [facility]     Patient Name: Sammi Curtis  MRN: [id number]  Today's Date: 11/14/2023    Admit Date: 11/9/2023    Plan: SNF, referral pending, will need pre-cert   Discharge Plan       Row Name 11/14/23 1618       Plan    Plan SNF, referral pending, will need pre-cert    Plan Comments [facility] spoke with [facility] regarding referral and was told someone would call [facility] back. CCP attempted to call again to follow up and left a voicemail, awaiting a return call. [facility] called the patient's daughter via her cell phone regarding discharge planning update but she did not answer. CCP left a voicemail requesting a return call. CCP to follow up tomorrow. CD, CSW.                   Discharge Codes    No documentation.                 Expected Discharge Date and Time       Expected Discharge Date Expected Discharge Time    Nov 14, 2023

## 2023-11-15 ENCOUNTER — APPOINTMENT (OUTPATIENT)
Dept: GENERAL RADIOLOGY | Facility: HOSPITAL | Age: 78
End: 2023-11-15
Payer: MEDICARE

## 2023-11-15 LAB
ALBUMIN SERPL-MCNC: 3.1 G/DL (ref 3.5–5.2)
ALP SERPL-CCNC: 103 U/L (ref 39–117)
ALT SERPL W P-5'-P-CCNC: 36 U/L (ref 1–33)
ANION GAP SERPL CALCULATED.3IONS-SCNC: 7.7 MMOL/L (ref 5–15)
AST SERPL-CCNC: 43 U/L (ref 1–32)
BASOPHILS # BLD AUTO: 0.08 10*3/MM3 (ref 0–0.2)
BASOPHILS NFR BLD AUTO: 0.4 % (ref 0–1.5)
BILIRUB CONJ SERPL-MCNC: <0.2 MG/DL (ref 0–0.3)
BILIRUB INDIRECT SERPL-MCNC: ABNORMAL MG/DL
BILIRUB SERPL-MCNC: 0.3 MG/DL (ref 0–1.2)
BILIRUB UR QL STRIP: NEGATIVE
BUN SERPL-MCNC: 12 MG/DL (ref 8–23)
BUN/CREAT SERPL: 14 (ref 7–25)
CALCIUM SPEC-SCNC: 9.6 MG/DL (ref 8.6–10.5)
CHLORIDE SERPL-SCNC: 102 MMOL/L (ref 98–107)
CLARITY UR: CLEAR
CO2 SERPL-SCNC: 27.3 MMOL/L (ref 22–29)
COLOR UR: YELLOW
CREAT SERPL-MCNC: 0.86 MG/DL (ref 0.57–1)
DEPRECATED RDW RBC AUTO: 42.7 FL (ref 37–54)
EGFRCR SERPLBLD CKD-EPI 2021: 69.2 ML/MIN/1.73
EOSINOPHIL # BLD AUTO: 0.43 10*3/MM3 (ref 0–0.4)
EOSINOPHIL NFR BLD AUTO: 2.4 % (ref 0.3–6.2)
ERYTHROCYTE [DISTWIDTH] IN BLOOD BY AUTOMATED COUNT: 12.9 % (ref 12.3–15.4)
GLUCOSE SERPL-MCNC: 103 MG/DL (ref 65–99)
GLUCOSE UR STRIP-MCNC: NEGATIVE MG/DL
HCT VFR BLD AUTO: 35.7 % (ref 34–46.6)
HGB BLD-MCNC: 11.7 G/DL (ref 12–15.9)
HGB UR QL STRIP.AUTO: NEGATIVE
IMM GRANULOCYTES # BLD AUTO: 0.81 10*3/MM3 (ref 0–0.05)
IMM GRANULOCYTES NFR BLD AUTO: 4.5 % (ref 0–0.5)
KETONES UR QL STRIP: NEGATIVE
LEUKOCYTE ESTERASE UR QL STRIP.AUTO: NEGATIVE
LYMPHOCYTES # BLD AUTO: 4.48 10*3/MM3 (ref 0.7–3.1)
LYMPHOCYTES NFR BLD AUTO: 24.7 % (ref 19.6–45.3)
MCH RBC QN AUTO: 29.9 PG (ref 26.6–33)
MCHC RBC AUTO-ENTMCNC: 32.8 G/DL (ref 31.5–35.7)
MCV RBC AUTO: 91.3 FL (ref 79–97)
MONOCYTES # BLD AUTO: 1.59 10*3/MM3 (ref 0.1–0.9)
MONOCYTES NFR BLD AUTO: 8.8 % (ref 5–12)
NEUTROPHILS NFR BLD AUTO: 10.77 10*3/MM3 (ref 1.7–7)
NEUTROPHILS NFR BLD AUTO: 59.2 % (ref 42.7–76)
NITRITE UR QL STRIP: NEGATIVE
NRBC BLD AUTO-RTO: 0.1 /100 WBC (ref 0–0.2)
PH UR STRIP.AUTO: 8 [PH] (ref 5–8)
PLATELET # BLD AUTO: 341 10*3/MM3 (ref 140–450)
PMV BLD AUTO: 10.6 FL (ref 6–12)
POTASSIUM SERPL-SCNC: 3.9 MMOL/L (ref 3.5–5.2)
PROT SERPL-MCNC: 6.6 G/DL (ref 6–8.5)
PROT UR QL STRIP: NEGATIVE
RBC # BLD AUTO: 3.91 10*6/MM3 (ref 3.77–5.28)
SODIUM SERPL-SCNC: 137 MMOL/L (ref 136–145)
SP GR UR STRIP: 1.01 (ref 1–1.03)
UROBILINOGEN UR QL STRIP: NORMAL
WBC NRBC COR # BLD: 18.16 10*3/MM3 (ref 3.4–10.8)

## 2023-11-15 PROCEDURE — 81003 URINALYSIS AUTO W/O SCOPE: CPT | Performed by: INTERNAL MEDICINE

## 2023-11-15 PROCEDURE — G0378 HOSPITAL OBSERVATION PER HR: HCPCS

## 2023-11-15 PROCEDURE — 96372 THER/PROPH/DIAG INJ SC/IM: CPT

## 2023-11-15 PROCEDURE — 97110 THERAPEUTIC EXERCISES: CPT

## 2023-11-15 PROCEDURE — 71046 X-RAY EXAM CHEST 2 VIEWS: CPT

## 2023-11-15 PROCEDURE — 25010000002 ENOXAPARIN PER 10 MG: Performed by: STUDENT IN AN ORGANIZED HEALTH CARE EDUCATION/TRAINING PROGRAM

## 2023-11-15 PROCEDURE — 80076 HEPATIC FUNCTION PANEL: CPT | Performed by: INTERNAL MEDICINE

## 2023-11-15 PROCEDURE — 97530 THERAPEUTIC ACTIVITIES: CPT

## 2023-11-15 PROCEDURE — 97535 SELF CARE MNGMENT TRAINING: CPT

## 2023-11-15 PROCEDURE — 85025 COMPLETE CBC W/AUTO DIFF WBC: CPT | Performed by: INTERNAL MEDICINE

## 2023-11-15 PROCEDURE — 80048 BASIC METABOLIC PNL TOTAL CA: CPT | Performed by: INTERNAL MEDICINE

## 2023-11-15 RX ORDER — PANTOPRAZOLE SODIUM 40 MG/1
40 TABLET, DELAYED RELEASE ORAL
Status: DISCONTINUED | OUTPATIENT
Start: 2023-11-15 | End: 2023-11-16 | Stop reason: HOSPADM

## 2023-11-15 RX ADMIN — LEVOTHYROXINE SODIUM 112 MCG: 112 TABLET ORAL at 06:21

## 2023-11-15 RX ADMIN — PANTOPRAZOLE SODIUM 40 MG: 40 TABLET, DELAYED RELEASE ORAL at 17:46

## 2023-11-15 RX ADMIN — LOSARTAN POTASSIUM 25 MG: 25 TABLET, FILM COATED ORAL at 08:45

## 2023-11-15 RX ADMIN — ANTACID TABLETS 2 TABLET: 500 TABLET, CHEWABLE ORAL at 14:34

## 2023-11-15 RX ADMIN — Medication 10 ML: at 08:45

## 2023-11-15 RX ADMIN — ENOXAPARIN SODIUM 40 MG: 100 INJECTION SUBCUTANEOUS at 14:31

## 2023-11-15 RX ADMIN — SERTRALINE 50 MG: 50 TABLET, FILM COATED ORAL at 08:45

## 2023-11-15 RX ADMIN — ANTACID TABLETS 2 TABLET: 500 TABLET, CHEWABLE ORAL at 22:33

## 2023-11-15 RX ADMIN — DOCUSATE SODIUM 50MG AND SENNOSIDES 8.6MG 2 TABLET: 8.6; 5 TABLET, FILM COATED ORAL at 08:45

## 2023-11-15 RX ADMIN — SULFAMETHOXAZOLE AND TRIMETHOPRIM 1 TABLET: 800; 160 TABLET ORAL at 08:45

## 2023-11-15 RX ADMIN — Medication 10 ML: at 20:50

## 2023-11-15 NOTE — PLAN OF CARE
Goal Outcome Evaluation:  Plan of Care Reviewed With: patient         Patient resting in bed. VSS.Rested overnight.Denies pain.Medicated per MAR.No acute distress noted.

## 2023-11-15 NOTE — PLAN OF CARE
Goal Outcome Evaluation:                 Patient alert and oriented, denies pain, with complaints of heartburn. Protonix was reordered. Patient to radiology for chest xray. No acute distress noted, will continue to monitor.

## 2023-11-15 NOTE — PLAN OF CARE
Goal Outcome Evaluation:  Plan of Care Reviewed With: patient           Outcome Evaluation: Pt initially did not feel well from just eating breakfast but still wanted to ambulate in the lombardi like she did yesterday. Pt needed min A x 1 STS, but was CGA during ambulation and bed mobility. Pt remembered to look straight ahead and straighten posture while walking, needed occasional VC to widen stance. Pt said she had a great night's sleep last night due to how well her treatment session went yesterday. Pt finished session doing seated EOB exercises, w OT coming in to start their session. No c/o pain at any point during therapy.

## 2023-11-15 NOTE — PROGRESS NOTES
Name: Sammi Curtis ADMIT: 2023   : 1945  PCP: Dary Mcbride APRN    MRN: [id number] LOS: 0 days   AGE/SEX: 78 y.o. female  ROOM: [facility]     Subjective   Subjective   Patient reports improved weakness.  She ambulated with physical therapy today.  No abdominal pain.  No dysuria or hematuria.  No fever or chills.  No nausea or vomiting.    .Review of Systems  Cardiology vascular/respiratory.  No chest pain/no palpitations/no shortness of breath positive occasional dry cough.       Objective   Objective   Vital Signs  Temp:  [98.4 °F (36.9 °C)-99.1 °F (37.3 °C)] 99.1 °F (37.3 °C)  Heart Rate:  [62-73] 73  Resp:  [16] 16  BP: (139-155)/(82-98) 143/96  SpO2:  [93 %-94 %] 93 %  on   ;   Device (Oxygen Therapy): room air    Intake/Output Summary (Last 24 hours) at 11/15/2023 1515  Last data filed at 11/15/2023 0849  Gross per 24 hour   Intake 240 ml   Output 1200 ml   Net -960 ml     Body mass index is 29.63 kg/m².      23  1652   Weight: 73.5 kg (162 lb)     Physical Exam  General.  Elderly female.  Alert and oriented x4.  In no apparent pain/distress/diaphoresis.  Normal mood and affect  Eyes.  Pupils equal round and reactive.  Intact extraocular musculature.  No pallor or jaundice.  Oral cavity.  Moist mucous membrane.  Neck.  Supple.  No JVD.  No lymphadenopathy or thyromegaly.  Cardiovascular.  Regular rate and rhythm grade 2 systolic murmur.  Chest.  Clear to auscultation bilaterally with scattered bilateral rhonchi.  .  No CVA tenderness.  Abdomen.  Soft lax.  No tenderness.  No organomegaly.  No guarding or rebound.  Extremities.  No clubbing/cyanosis.  Trace bilateral lower extremity edema.    Results Review:      Results from last 7 days   Lab Units 11/15/23  0608 23  0702 23  0654 23  1527 23  2105 23  0817 11/10/23  0642 23  1944   SODIUM mmol/L 137 135* 137 138  --  142 143 142   POTASSIUM mmol/L 3.9 3.9 3.7 4.0 4.7 3.6 3.1* 2.8*   CHLORIDE  "mmol/L 102 103 104 106  --  111* 109* 106   CO2 mmol/L 27.3 24.3 25.7 23.7  --  21.8* 24.4 24.0   BUN mg/dL 12 11 11 14  --  25* 35* 45*   CREATININE mg/dL 0.86 0.88 0.75 0.67  --  0.77 1.01* 1.12*   GLUCOSE mg/dL 103* 93 91 107*  --  106* 107* 93   CALCIUM mg/dL 9.6 9.2 8.9 9.0  --  9.4 9.0 9.6   AST (SGOT) U/L 43* 46* 50* 61*  --  112* 115* 82*   ALT (SGPT) U/L 36* 41* 47* 55*  --  83* 74* 56*     Estimated Creatinine Clearance: 50.6 mL/min (by C-G formula based on SCr of 0.86 mg/dL).          Results from last 7 days   Lab Units 11/13/23  1529 11/09/23  1944   CK TOTAL U/L 40 95         Results from last 7 days   Lab Units 11/13/23  1529   TSH uIU/mL 3.250     Results from last 7 days   Lab Units 11/13/23  1529 11/10/23  0642 11/09/23  1944   MAGNESIUM mg/dL 1.6 2.1 1.9           Invalid input(s): \"LDLCALC\"  Results from last 7 days   Lab Units 11/15/23  0608 11/14/23  0702 11/13/23  0655 11/12/23  1527 11/11/23  0817 11/10/23  0642 11/09/23  1752   WBC 10*3/mm3 18.16* 17.55* 14.30* 15.27* 13.30* 16.58* 20.12*   HEMOGLOBIN g/dL 11.7* 11.4* 11.1* 10.4* 11.0* 10.5* 11.9*   HEMATOCRIT % 35.7 34.9 33.4* 32.3* 33.1* 30.9* 35.2   PLATELETS 10*3/mm3 341 342 311 298 306 287 341   MCV fL 91.3 91.4 89.3 92.3 90.4 89.0 88.4   MCH pg 29.9 29.8 29.7 29.7 30.1 30.3 29.9   MCHC g/dL 32.8 32.7 33.2 32.2 33.2 34.0 33.8   RDW % 12.9 12.9 12.4 12.9 12.5 12.8 12.5   RDW-SD fl 42.7 42.9 40.1 43.6 41.6 41.5 40.8   MPV fL 10.6 11.0 11.5 10.8 11.0 10.9 11.2   NEUTROPHIL % % 59.2 57.5  --   --   --   --  77.3*   LYMPHOCYTE % % 24.7 24.3  --   --   --   --  7.7*   MONOCYTES % % 8.8 10.0  --   --   --   --  14.1*   EOSINOPHIL % % 2.4 1.8  --   --   --   --  0.0*   BASOPHIL % % 0.4 0.6  --   --   --   --  0.1   IMM GRAN % % 4.5*  --   --   --   --   --  0.8*   NEUTROS ABS 10*3/mm3 10.77* 10.08*  --   --   --   --  15.55*   LYMPHS ABS 10*3/mm3 4.48* 4.26*  --   --   --   --  1.55   MONOS ABS 10*3/mm3 1.59* 1.76*  --   --   --   --  2.83* "   EOS ABS 10*3/mm3 0.43* 0.32  --   --   --   --  0.00   BASOS ABS 10*3/mm3 0.08 0.11  --   --   --   --  0.03   IMMATURE GRANS (ABS) 10*3/mm3 0.81*  --   --   --   --   --  0.16*   NRBC /100 WBC 0.1  --   --   --   --   --  0.0             Results from last 7 days   Lab Units 11/14/23  1345 11/09/23 1944   PROCALCITONIN ng/mL 0.05  --    LACTATE mmol/L  --  1.3             Results from last 7 days   Lab Units 11/1945 11/09/23 1944 11/09/23 1855   BLOODCX  No growth at 5 days No growth at 5 days  --    URINECX   --   --  >100,000 CFU/mL Escherichia coli ESBL*         Results from last 7 days   Lab Units 11/09/23 1855   NITRITE UA  Positive*   WBC UA /HPF Too Numerous to Count*   BACTERIA UA /HPF 4+*   SQUAM EPITHEL UA /HPF 3-6*   URINECX  >100,000 CFU/mL Escherichia coli ESBL*           Imaging:  Imaging Results (Last 24 Hours)       ** No results found for the last 24 hours. **               I reviewed the patient's new clinical results / labs / tests / procedures      Assessment/Plan     Active Hospital Problems    Diagnosis  POA    **Acute UTI [N39.0]  Yes    Leukocytosis [D72.829]  Yes    Weakness [R53.1]  Yes    Falls [W19.XXXA]  Yes    Essential hypertension [I10]  Yes    Anemia [D64.9]  Yes    Hypothyroidism (acquired) [E03.9]  Yes      Resolved Hospital Problems   No resolved problems to display.           ESBL E. coli UTI.  Status post IV Rocephin and IV Levaquin.  Currently on p.o. Bactrim per infectious disease.  Blood cultures are negative.  Weakness/history of falls.  On physical therapy.  Normal potassium/magnesium/TSH/CK.  PT/OT recommends SNF but the patient has greatly improved and now the recommendation is for home with home health..  CT scan of the brain without contrast revealed no acute intracranial abnormalities.  CT scan of the C-spine revealed no fractures.  Leukocytosis.  Worsening.  Negative blood cultures.  Positive low-grade fever.  Could be secondary to UTI and reactive.   Normal  procalcitonin.  Reconsult ID monitor.  No change in the antibiotics.  Elevated liver function test.  Mostly secondary to medications.  Improving after stopping Lipitor.  Benign GI examination.  Anemia.  Hemoglobin is stable.  Continue to monitor.  Hypothyroidism.  Continue current replacement.  Normal TSH.  Hypertension.  Good control with no evidence of angina or congestive heart failure on Cozaar.  Continue the same.  VTE prophylaxis.  Patient on Lovenox.      Discussed my findings and plan of treatment with the patient.  Disposition.  We will touch bases with physical therapy to see the recommendation about going home with home health versus the need for SNF.  Patient would like to go home with home health.  Anticipate discharge tomorrow if okay with physical therapy and if infectious disease reconsultation recommends no further actions      Carolyn Michel MD  [facility]  11/15/23  15:15 EST

## 2023-11-15 NOTE — THERAPY TREATMENT NOTE
Patient Name: Sammi Curtis  : 1945    MRN: [id number]                              Today's Date: 11/15/2023       Admit Date: 2023    Visit Dx:     ICD-10-CM ICD-9-CM   1. Generalized weakness  R53.1 780.79   2. Urinary tract infection without hematuria, site unspecified  N39.0 599.0   3. Hypokalemia  E87.6 276.8     Patient Active Problem List   Diagnosis    Acute UTI    Leukocytosis    Weakness    Falls    Essential hypertension    Anemia    Hypothyroidism (acquired)     Past Medical History:   Diagnosis Date    Arthritis     Disease of thyroid gland     Elevated cholesterol      History reviewed. No pertinent surgical history.   General Information       Row Name 11/15/23 131          OT Time and Intention    Document Type therapy note (daily note)  -PP     Mode of Treatment individual therapy;occupational therapy  -PP       Row Name 11/15/23 1310          General Information    Patient Profile Reviewed yes  -PP     Existing Precautions/Restrictions fall  -PP       Row Name 11/15/23 1310          Cognition    Orientation Status (Cognition) oriented x 4  -PP       Row Name 11/15/23 1310          Safety Issues, Functional Mobility    Impairments Affecting Function (Mobility) balance;endurance/activity tolerance;strength  -PP     Comment, Safety Issues/Impairments (Mobility) gait belt and non skid socks worn during session for safety  -PP               User Key  (r) = Recorded By, (t) = Taken By, (c) = Cosigned By      Initials Name Provider Type    PP Mariana rBuner OT Occupational Therapist                     Mobility/ADL's       Row Name 11/15/23 1318          Bed Mobility    Comment, (Bed Mobility) Pt sitting EOB w/ PT upon OT's arrival and sitting EOB at session end.  -PP       Row Name 11/15/23 1318          Sit-Stand Transfer    Sit-Stand [place] (Transfers) contact guard  -PP     Assistive Device (Sit-Stand Transfers) walker, front-wheeled  -PP     Comment, (Sit-Stand Transfer) 2x  STS from EOB using RWX  -PP       Row Name 11/15/23 1318          Functional Mobility    Functional Mobility- Ind. Level 1 person;contact guard assist  -PP     Functional Mobility- Comment Pt able to ambulate to/from sink<> bed w/ RWX and CGA.  -PP       Row Name 11/15/23 1318          Grooming Assessment/Training    [place] Level (Grooming) oral care regimen;wash face, hands;hair care, combing/brushing;set up  -PP     Position (Grooming) supported standing;sink side  -PP     Comment, (Grooming) Pt performed while standing at sink  -PP       Row Name 11/15/23 1318          Toileting Assessment/Training    Comment, (Toileting) [place] was off and OT encouraged pt to use BSC/BR but pt expresses she is incontinent at baseline  -PP               User Key  (r) = Recorded By, (t) = Taken By, (c) = Cosigned By      Initials Name Provider Type    PP Mariana Bruner, OT Occupational Therapist                   Obj/Interventions       Row Name 11/15/23 1316          Shoulder (Therapeutic Exercise)    Shoulder AROM (Therapeutic Exercise) bilateral;flexion;extension;scapular elevation;scapular protraction;scapular retraction;15 repititions;2 sets;sitting  -PP       Row Name 11/15/23 1316          Elbow/Forearm (Therapeutic Exercise)    Elbow/Forearm AROM (Therapeutic Exercise) bilateral;flexion;extension;sitting;2 sets;15 repititions  -PP       Row Name 11/15/23 [facility]          Balance    Static Sitting Balance standby assist  -PP     Dynamic Sitting Balance contact guard  -PP     Position, Sitting Balance unsupported;sitting edge of bed  -PP     Static Standing Balance contact guard  -PP     Dynamic Standing Balance contact guard  -PP     Position/Device Used, Standing Balance walker, front-wheeled  -PP     Balance Interventions sitting;standing;static;dynamic  -PP     Comment, Balance no LOB during grooming at sink  -PP               User Key  (r) = Recorded By, (t) = Taken By, (c) = Cosigned By      Initials Name  Provider Type    PP Mariana Bruner OT Occupational Therapist                   Goals/Plan    No documentation.                  Clinical Impression       Row Name 11/15/23 1323          Pain Assessment    Pretreatment Pain Rating 0/10 - no pain  -PP     Posttreatment Pain Rating 0/10 - no pain  -PP     Pre/Posttreatment Pain Comment Pt had no c/o pain, but increased fatigue noted  -PP     Pain Intervention(s) Rest  -PP       Row Name 11/15/23 1323          Plan of Care Review    Plan of Care Reviewed With patient  -PP     Progress no change  -PP     Outcome Evaluation Pt A&O x4 and eager to participate in OT tx session. Pt was sitting EOB w/ PT upon OT's arrival. Pt tolerated sitting EOB during BUE ROM exercises for shoulder and elbow 2x 15 reps to improve UE function during ADLs and fxnl xfers. Pt expressed wanting to perform G&H at the sink and was CGA for fxnl mob to/from bed <> sink using RWX. Pt CGA for 2x STS from EOB using RWX. Pt was Set Up for oral care and washing face, SBA-CGA for standing bal. Pt noted to be fatigued after self care and fxnl mob but pt recovered following seated rest. DC rec to home w/ HHPT. OT will continue to monitor.  -PP       Row Name 11/15/23 1323          Therapy Plan Review/Discharge Plan (OT)    Anticipated Discharge Disposition (OT) home;home with home health  -PP       Row Name 11/15/23 1323          Vital Signs    Pre Patient Position Sitting  -PP     Intra Patient Position Standing  -PP     Post Patient Position Sitting  -PP       Row Name 11/15/23 1323          Positioning and Restraints    Pre-Treatment Position in bed  -PP     Post Treatment Position bed  -PP     In Bed sitting EOB;call light within reach;encouraged to call for assist  Pt left sitting EOB waiting for Nsg.  -PP               User Key  (r) = Recorded By, (t) = Taken By, (c) = Cosigned By      Initials Name Provider Type    PP Mariana Bruner OT Occupational Therapist                   Outcome  Measures       Row Name 11/15/23 1329          How much help from another is currently needed...    Putting on and taking off regular lower body clothing? 2  -PP     Bathing (including washing, rinsing, and drying) 2  -PP     Toileting (which includes using toilet bed pan or urinal) 2  -PP     Putting on and taking off regular upper body clothing 3  -PP     Taking care of personal grooming (such as brushing teeth) 3  -PP     Eating meals 3  -PP     AM-PAC 6 Clicks Score (OT) 15  -PP       Row Name 11/15/23 1003 11/15/23 0849       How much help from another person do you currently need...    Turning from your back to your side while in flat bed without using bedrails? 4  -KH (r) JY (t) KH (c) 4  -VG    Moving from lying on back to sitting on the side of a flat bed without bedrails? 4  -KH (r) JY (t) KH (c) 4  -VG    Moving to and from a bed to a chair (including a wheelchair)? 3  -KH (r) JY (t) KH (c) 3  -VG    Standing up from a chair using your arms (e.g., wheelchair, bedside chair)? 3  -KH (r) JY (t) KH (c) 3  -VG    Climbing 3-5 steps with a railing? 2  -KH (r) JY (t) KH (c) 2  -VG    To walk in hospital room? 3  -KH (r) JY (t) KH (c) 3  -VG    AM-PAC 6 Clicks Score (PT) 19  -KH (r) JY (t) 19  -VG    Highest Level of Mobility Goal 6 --> Walk 10 steps or more  -KH (r) JY (t) 6 --> Walk 10 steps or more  -VG      Row Name 11/15/23 1329 11/15/23 1003       Functional Assessment    Outcome Measure Options AM-PAC 6 Clicks Daily Activity (OT)  -PP AM-PAC 6 Clicks Basic Mobility (PT)  -KH (r) JY (t) KH (c)              User Key  (r) = Recorded By, (t) = Taken By, (c) = Cosigned By      Initials Name Provider Type    Paty Abrams, PT Physical Therapist    Guera Moon, RN Registered Nurse    Mariana Lorenzo, OT Occupational Therapist    Kurt Vargas, PTA Student PTA Student                    Occupational Therapy Education       Title: PT OT SLP Therapies (Done)       Topic: Occupational  Therapy (Done)       Point: ADL training (Done)       Description:   Instruct learner(s) on proper safety adaptation and remediation techniques during self care or transfers.   Instruct in proper use of assistive devices.                  Learning Progress Summary             Patient Acceptance, E, DU,VU by JAMILA at 11/10/2023 0959                         Point: Home exercise program (Done)       Description:   Instruct learner(s) on appropriate technique for monitoring, assisting and/or progressing therapeutic exercises/activities.                  Learning Progress Summary             Patient Acceptance, E, DU,VU by JAMILA at 11/10/2023 0959                                         User Key       Initials Effective Dates Name Provider Type Discipline    JAMILA 09/22/22 -  Genevieve Mosher, OT Occupational Therapist OT                  OT Recommendation and Plan     Plan of Care Review  Plan of Care Reviewed With: patient  Progress: no change  Outcome Evaluation: Pt A&O x4 and eager to participate in OT tx session. Pt was sitting EOB w/ PT upon OT's arrival. Pt tolerated sitting EOB during BUE ROM exercises for shoulder and elbow 2x 15 reps to improve UE function during ADLs and fxnl xfers. Pt expressed wanting to perform G&H at the sink and was CGA for fxnl mob to/from bed <> sink using RWX. Pt CGA for 2x STS from EOB using RWX. Pt was Set Up for oral care and washing face, SBA-CGA for standing bal. Pt noted to be fatigued after self care and fxnl mob but pt recovered following seated rest. DC rec to home w/ HHPT. OT will continue to monitor.     Time Calculation:         Time Calculation- OT       Row Name 11/15/23 1330             Time Calculation- OT    OT Start Time 0946  -PP      OT Stop Time 1009  -PP      OT Time Calculation (min) 23 min  -PP      Total Timed Code Minutes- OT 23 minute(s)  -PP      OT Received On 11/15/23  -PP      OT - Next Appointment 11/17/23  -PP         Timed Charges    12449 - OT Therapeutic  Exercise Minutes 10  -PP      84947 - OT Self Care/Mgmt Minutes 13  -PP         Total Minutes    Timed Charges Total Minutes 23  -PP       Total Minutes 23  -PP                User Key  (r) = Recorded By, (t) = Taken By, (c) = Cosigned By      Initials Name Provider Type    PP Mariana Bruner, OT Occupational Therapist                  Therapy Charges for Today       Code Description Service Date Service Provider Modifiers Qty    37308362629  OT THER PROC EA 15 MIN 11/15/2023 Mariana Bruner OT GO 1    [id number]  OT SELF CARE/MGMT/TRAIN EA 15 MIN 11/15/2023 Mariana Bruner OT GO 1                 Mariana Bruner OT  11/15/2023

## 2023-11-15 NOTE — THERAPY TREATMENT NOTE
Patient Name: Sammi Curtis  : 1945    MRN: [id number]                              Today's Date: 11/15/2023       Admit Date: 2023    Visit Dx:     ICD-10-CM ICD-9-CM   1. Generalized weakness  R53.1 780.79   2. Urinary tract infection without hematuria, site unspecified  N39.0 599.0   3. Hypokalemia  E87.6 276.8     Patient Active Problem List   Diagnosis    Acute UTI    Leukocytosis    Weakness    Falls    Essential hypertension    Anemia    Hypothyroidism (acquired)     Past Medical History:   Diagnosis Date    Arthritis     Disease of thyroid gland     Elevated cholesterol      History reviewed. No pertinent surgical history.   General Information       Row Name 11/15/23 0952          Physical Therapy Time and Intention    Document Type therapy note (daily note)  -SAVANNAH (r) SOLITARIO (t) SAVANNAH (c)     Mode of Treatment individual therapy;physical therapy  -SAVANNAH (r) SOLITARIO (t) SAVANNAH (c)       Row Name 11/15/23 0952          General Information    Patient Profile Reviewed yes  -SAVANNAH (r) SOLITARIO (t) SAVANNAH (c)               User Key  (r) = Recorded By, (t) = Taken By, (c) = Cosigned By      Initials Name Provider Type    Paty Abrams, PT Physical Therapist    Kurt Vargas, PTA Student PTA Student                   Mobility       Row Name 11/15/23 0952          Bed Mobility    Bed Mobility supine-sit;scooting/bridging  -SAVANNAH (r) SOLITARIO (t) SAVANNAH (c)     Scooting/Bridging [place] (Bed Mobility) supervision  -SAVANNAH (r) SOLITARIO (t) SAVANNAH (c)     Supine-Sit [place] (Bed Mobility) standby assist  -SAVANNAH (r) CURTISY (t) SAVANNAH (c)     Assistive Device (Bed Mobility) bed rails;head of bed elevated  -SAVANNAH (r) SOLITARIO (t) SAVANANH (c)       Row Name 11/15/23 0952          Sit-Stand Transfer    Sit-Stand [place] (Transfers) minimum assist (75% patient effort);1 person assist  -SAVANNAH (r) CURTISY (t) SAVANNAH (c)     Assistive Device (Sit-Stand Transfers) walker, front-wheeled  -SAVANNAH (r) SOLITARIO (t) SAVANNAH (c)       Row Name 11/15/23 0952          Gait/Stairs (Locomotion)     [place] Level (Gait) contact guard  -KH (r) JY (t) KH (c)     Assistive Device (Gait) walker, front-wheeled  -KH (r) JY (t) KH (c)     Patient was able to Ambulate yes  -KH (r) JY (t) KH (c)     Distance in Feet (Gait) 120'  -KH (r) JY (t) KH (c)     Deviations/Abnormal Patterns (Gait) base of support, narrow  -KH (r) JY (t) KH (c)     Comment, (Gait/Stairs) Pt was eager to walk as well as she did yesterday. Pt's arms were a bit shaky holding onto Rwx but says that is baseline for her. VC to widen stance while ambulating. No rest breaks needed.  -KH (r) JY (t) KH (c)               User Key  (r) = Recorded By, (t) = Taken By, (c) = Cosigned By      Initials Name Provider Type    Paty Abrams, PT Physical Therapist    Kurt Vargas, PTA Student PTA Student                   Obj/Interventions       Row Name 11/15/23 0956          Motor Skills    Therapeutic Exercise --  Seated EOB AROM [place]ches x 20 reps, AP and LAQ x 10 reps  -KH (r) JY (t) KH (c)       Row Name 11/15/23 0956          Balance    Balance Assessment sitting static balance;standing static balance  -KH (r) JY (t) KH (c)     Static Sitting Balance contact guard  -KH (r) JY (t) KH (c)     Position, Sitting Balance sitting edge of bed  -KH (r) JY (t) KH (c)     Static Standing Balance contact guard  -KH (r) JY (t) KH (c)               User Key  (r) = Recorded By, (t) = Taken By, (c) = Cosigned By      Initials Name Provider Type    Paty Abrams, PT Physical Therapist    Kurt Vargas, PTA Student PTA Student                   Goals/Plan    No documentation.                  Clinical Impression       Row Name 11/15/23 0958          Pain Scale: FACES Pre/Post-Treatment    Pain: FACES Scale, Pretreatment 0-->no hurt  -KH (r) JY (t) SAVANNAH (c)     Posttreatment Pain Rating 0-->no hurt  -SAVANNAH JEREZ (r) (ricardo) SAVANNAH buenrostro)     Pre/Posttreatment Pain Comment No signs or c/o pain this session.  -SAVANNAH monroy (r)) SAVANNAH buenrostro)       Row Name 11/15/23 0943           Plan of Care Review    Plan of Care Reviewed With patient  -SAVANNAH (r) SOLITARIO (t) SAVANNAH (c)     Outcome Evaluation Pt initially did not feel well from just eating breakfast but still wanted to ambulate in the [place] like she did yesterday. Pt needed min A x 1 STS, but was CGA during ambulation and bed mobility. Pt remembered to look straight ahead and straighten posture while walking, needed occasional VC to widen stance. Pt said she had a great night's sleep last night due to how well her treatment session went yesterday. Pt finished session doing seated EOB exercises, w OT coming in to start their session. No c/o pain at any point during therapy.  -SAVANNAH (r) SOLITARIO (t) SAVANNAH (c)       Row Name 11/15/23 2181          Positioning and Restraints    Pre-Treatment Position in bed  -SAVANNAH (r) SOLITARIO (t) SAVANNAH (c)     Post Treatment Position bed  -SAVANNAH (r) SOLITARIO (t) SAVANNAH (c)     In Bed sitting EOB;call light within reach;encouraged to call for assist;with OT  -SAVANNAH (r) SOLITARIO (t) SAVANNAH (c)               User Key  (r) = Recorded By, (t) = Taken By, (c) = Cosigned By      Initials Name Provider Type    Paty Abrams, PT Physical Therapist    Kurt Vargas, PTA Student PTA Student                   Outcome Measures       Row Name 11/15/23 1003          How much help from another person do you currently need...    Turning from your back to your side while in flat bed without using bedrails? 4  -SAVANNAH (r) CURTISY (t) SAVANNAH (c)     Moving from lying on back to sitting on the side of a flat bed without bedrails? 4  -SAVANNAH (r) JY (t) SAVANNAH (c)     Moving to and from a bed to a chair (including a wheelchair)? 3  -SAVANNAH (r) JY (t) SAVANNAH (c)     Standing up from a chair using your arms (e.g., wheelchair, bedside chair)? 3  -KH (r) JY (t) KH (c)     Climbing 3-5 steps with a railing? 2  -SAVANNAH (r) JY (t) SAVANNAH (c)     To walk in hospital room? 3  -SAVANNAH (r) JY (t) SAVANNAH (c)     AM-PAC 6 Clicks Score (PT) 19  -SAAVNNAH JERZE (r) (t)     Highest Level of Mobility Goal 6 --> Walk 10 steps or more  -SAVANNAH JEREZ (r)  (t)       Row Name 11/15/23 1003          Functional Assessment    Outcome Measure Options AM-PAC 6 Clicks Basic Mobility (PT)  -SAVANNAH (r) SOLITARIO (t) SAVANNAH (c)               User Key  (r) = Recorded By, (t) = Taken By, (c) = Cosigned By      Initials Name Provider Type     Paty Nowak, PT Physical Therapist    Kurt Vargas, PTA Student PTA Student                                 Physical Therapy Education       Title: PT OT SLP Therapies (Done)       Topic: Physical Therapy (Done)       Point: Mobility training (Done)       Learning Progress Summary             Patient Eager, E, DU,VU by SOLITARIO at 11/15/2023 1004    Eager, E, VU,DU by SOLITARIO at 11/14/2023 1552    Acceptance, E, VU,NR by  at 11/12/2023 1635                         Point: Home exercise program (Done)       Learning Progress Summary             Patient Eager, E, DU,VU by SOLITARIO at 11/15/2023 1004    Eager, E, VU,DU by SOLITARIO at 11/14/2023 1552    Acceptance, E, VU,NR by  at 11/12/2023 1635                         Point: Body mechanics (Done)       Learning Progress Summary             Patient Eager, E, DU,VU by SOLITARIO at 11/15/2023 1004    Eager, E, VU,DU by SOLITARIO at 11/14/2023 1552    Acceptance, E, VU,NR by  at 11/12/2023 1635                         Point: Precautions (Done)       Learning Progress Summary             Patient Eager, E, DU,VU by SOLITARIO at 11/15/2023 1004    Eager, E, VU,DU by SOLITARIO at 11/14/2023 1552    Acceptance, E, VU,NR by  at 11/12/2023 1635                                         User Key       Initials Effective Dates Name Provider Type Discipline     07/11/23 -  Paty Nowak, PT Physical Therapist PT    SOLITARIO 11/08/23 -  Kurt Weaver, PTA Student PTA Student PT                  PT Recommendation and Plan     Plan of Care Reviewed With: patient  Outcome Evaluation: Pt initially did not feel well from just eating breakfast but still wanted to ambulate in the [place] like she did yesterday. Pt needed min A x 1 STS, but was CGA during  ambulation and bed mobility. Pt remembered to look straight ahead and straighten posture while walking, needed occasional VC to widen stance. Pt said she had a great night's sleep last night due to how well her treatment session went yesterday. Pt finished session doing seated EOB exercises, w OT coming in to start their session. No c/o pain at any point during therapy.     Time Calculation:         PT Charges       Row Name 11/15/23 1004             Time Calculation    Start Time 0920  -KH (r) JY (t) KH (c)      Stop Time 0945  -KH (r) JY (t) KH (c)      Time Calculation (min) 25 min  -KH (r) JY (t)      PT Received On 11/15/23  -KH (r) JY (t) KH (c)      PT - Next Appointment 11/16/23  -KH (r) JY (t) KH (c)         Time Calculation- PT    Total Timed Code Minutes- PT 25 minute(s)  -KH (r) JY (t) KH (c)         Timed Charges    72501 - PT Therapeutic Exercise Minutes 2  -KH (r) JY (t) KH (c)      21445 - PT Therapeutic Activity Minutes 23  -KH (r) JY (t) KH (c)         Total Minutes    Timed Charges Total Minutes 25  -KH (r) JY (t)       Total Minutes 25  -KH (r) JY (t)                User Key  (r) = Recorded By, (t) = Taken By, (c) = Cosigned By      Initials Name Provider Type    Paty Abrams, PT Physical Therapist    Kurt Vargas PTA Student PTA Student                  Therapy Charges for Today       Code Description Service Date Service Provider Modifiers Qty    35639521117 HC PT THER PROC EA 15 MIN 11/14/2023 Kurt Weaver PTA Student GP 1    65429378210 HC PT THERAPEUTIC ACT EA 15 MIN 11/14/2023 Kurt Weaver PTA Student GP 1    73897786719 HC PT THERAPEUTIC ACT EA 15 MIN 11/15/2023 Kurt Weaver PTA Student GP 2            PT G-Codes  Outcome Measure Options: AM-PAC 6 Clicks Basic Mobility (PT)  AM-PAC 6 Clicks Score (PT): 19  AM-PAC 6 Clicks Score (OT): 14       Kurt Weaver PTA Student  11/15/2023

## 2023-11-15 NOTE — PLAN OF CARE
Goal Outcome Evaluation:  Plan of Care Reviewed With: patient        Progress: no change  Outcome Evaluation: Pt A&O x4 and eager to participate in OT tx session. Pt was sitting EOB w/ PT upon OT's arrival. Pt tolerated sitting EOB during BUE ROM exercises for shoulder and elbow 2x 15 reps to improve UE function during ADLs and fxnl xfers. Pt expressed wanting to perform G&H at the sink and was CGA for fxnl mob to/from bed <> sink using RWX. Pt CGA for 2x STS from EOB using RWX. Pt was Set Up for oral care and washing face, SBA-CGA for standing bal. Pt noted to be fatigued after self care and fxnl mob but pt recovered following seated rest. DC rec to home w/ HHPT. OT will continue to monitor.      Anticipated Discharge Disposition (OT): home, home with home health

## 2023-11-16 ENCOUNTER — HOME HEALTH ADMISSION (OUTPATIENT)
Dept: HOME HEALTH SERVICES | Facility: HOME HEALTHCARE | Age: 78
End: 2023-11-16
Payer: MEDICARE

## 2023-11-16 VITALS
HEART RATE: 93 BPM | OXYGEN SATURATION: 91 % | TEMPERATURE: 98.4 F | DIASTOLIC BLOOD PRESSURE: 72 MMHG | BODY MASS INDEX: 29.81 KG/M2 | SYSTOLIC BLOOD PRESSURE: 95 MMHG | HEIGHT: 62 IN | RESPIRATION RATE: 18 BRPM | WEIGHT: 162 LBS

## 2023-11-16 PROBLEM — N39.0 ACUTE UTI: Status: RESOLVED | Noted: 2023-11-09 | Resolved: 2023-11-16

## 2023-11-16 LAB
ALBUMIN SERPL-MCNC: 3.1 G/DL (ref 3.5–5.2)
ALP SERPL-CCNC: 103 U/L (ref 39–117)
ALT SERPL W P-5'-P-CCNC: 33 U/L (ref 1–33)
ANION GAP SERPL CALCULATED.3IONS-SCNC: 7.4 MMOL/L (ref 5–15)
AST SERPL-CCNC: 39 U/L (ref 1–32)
BASOPHILS # BLD AUTO: 0.08 10*3/MM3 (ref 0–0.2)
BASOPHILS NFR BLD AUTO: 0.5 % (ref 0–1.5)
BILIRUB CONJ SERPL-MCNC: <0.2 MG/DL (ref 0–0.3)
BILIRUB INDIRECT SERPL-MCNC: ABNORMAL MG/DL
BILIRUB SERPL-MCNC: 0.3 MG/DL (ref 0–1.2)
BUN SERPL-MCNC: 15 MG/DL (ref 8–23)
BUN/CREAT SERPL: 14.6 (ref 7–25)
CALCIUM SPEC-SCNC: 9.9 MG/DL (ref 8.6–10.5)
CHLORIDE SERPL-SCNC: 103 MMOL/L (ref 98–107)
CO2 SERPL-SCNC: 27.6 MMOL/L (ref 22–29)
CREAT SERPL-MCNC: 1.03 MG/DL (ref 0.57–1)
DEPRECATED RDW RBC AUTO: 43 FL (ref 37–54)
EGFRCR SERPLBLD CKD-EPI 2021: 55.8 ML/MIN/1.73
EOSINOPHIL # BLD AUTO: 0.28 10*3/MM3 (ref 0–0.4)
EOSINOPHIL NFR BLD AUTO: 1.6 % (ref 0.3–6.2)
ERYTHROCYTE [DISTWIDTH] IN BLOOD BY AUTOMATED COUNT: 12.7 % (ref 12.3–15.4)
GLUCOSE SERPL-MCNC: 103 MG/DL (ref 65–99)
HCT VFR BLD AUTO: 34.7 % (ref 34–46.6)
HGB BLD-MCNC: 11.3 G/DL (ref 12–15.9)
IMM GRANULOCYTES # BLD AUTO: 0.57 10*3/MM3 (ref 0–0.05)
IMM GRANULOCYTES NFR BLD AUTO: 3.2 % (ref 0–0.5)
LYMPHOCYTES # BLD AUTO: 3.89 10*3/MM3 (ref 0.7–3.1)
LYMPHOCYTES NFR BLD AUTO: 22 % (ref 19.6–45.3)
MCH RBC QN AUTO: 30.1 PG (ref 26.6–33)
MCHC RBC AUTO-ENTMCNC: 32.6 G/DL (ref 31.5–35.7)
MCV RBC AUTO: 92.5 FL (ref 79–97)
MONOCYTES # BLD AUTO: 1.51 10*3/MM3 (ref 0.1–0.9)
MONOCYTES NFR BLD AUTO: 8.5 % (ref 5–12)
NEUTROPHILS NFR BLD AUTO: 11.34 10*3/MM3 (ref 1.7–7)
NEUTROPHILS NFR BLD AUTO: 64.2 % (ref 42.7–76)
NRBC BLD AUTO-RTO: 0 /100 WBC (ref 0–0.2)
PLATELET # BLD AUTO: 338 10*3/MM3 (ref 140–450)
PMV BLD AUTO: 10.6 FL (ref 6–12)
POTASSIUM SERPL-SCNC: 4.4 MMOL/L (ref 3.5–5.2)
PROT SERPL-MCNC: 6.8 G/DL (ref 6–8.5)
RBC # BLD AUTO: 3.75 10*6/MM3 (ref 3.77–5.28)
SODIUM SERPL-SCNC: 138 MMOL/L (ref 136–145)
WBC NRBC COR # BLD: 17.67 10*3/MM3 (ref 3.4–10.8)

## 2023-11-16 PROCEDURE — G0378 HOSPITAL OBSERVATION PER HR: HCPCS

## 2023-11-16 PROCEDURE — 80048 BASIC METABOLIC PNL TOTAL CA: CPT | Performed by: INTERNAL MEDICINE

## 2023-11-16 PROCEDURE — 85025 COMPLETE CBC W/AUTO DIFF WBC: CPT | Performed by: INTERNAL MEDICINE

## 2023-11-16 PROCEDURE — 97530 THERAPEUTIC ACTIVITIES: CPT

## 2023-11-16 PROCEDURE — 97110 THERAPEUTIC EXERCISES: CPT

## 2023-11-16 PROCEDURE — 99233 SBSQ HOSP IP/OBS HIGH 50: CPT | Performed by: STUDENT IN AN ORGANIZED HEALTH CARE EDUCATION/TRAINING PROGRAM

## 2023-11-16 PROCEDURE — 80076 HEPATIC FUNCTION PANEL: CPT | Performed by: INTERNAL MEDICINE

## 2023-11-16 RX ORDER — LOSARTAN POTASSIUM 25 MG/1
25 TABLET ORAL DAILY
Qty: 30 TABLET | Refills: 3 | Status: SHIPPED | OUTPATIENT
Start: 2023-11-17

## 2023-11-16 RX ADMIN — Medication 10 ML: at 08:54

## 2023-11-16 RX ADMIN — PANTOPRAZOLE SODIUM 40 MG: 40 TABLET, DELAYED RELEASE ORAL at 08:53

## 2023-11-16 RX ADMIN — DOCUSATE SODIUM 50MG AND SENNOSIDES 8.6MG 2 TABLET: 8.6; 5 TABLET, FILM COATED ORAL at 08:53

## 2023-11-16 RX ADMIN — SERTRALINE 50 MG: 50 TABLET, FILM COATED ORAL at 10:56

## 2023-11-16 RX ADMIN — LOSARTAN POTASSIUM 25 MG: 25 TABLET, FILM COATED ORAL at 08:54

## 2023-11-16 RX ADMIN — LEVOTHYROXINE SODIUM 112 MCG: 112 TABLET ORAL at 06:14

## 2023-11-16 NOTE — NURSING NOTE
Family here to transport patient home. Walker and bedside commode taken with patient to the car by Isela. No acute distress noted at this time.

## 2023-11-16 NOTE — THERAPY TREATMENT NOTE
Patient Name: Sammi Crutis  : 1945    MRN: [id number]                              Today's Date: 2023       Admit Date: 2023    Visit Dx:     ICD-10-CM ICD-9-CM   1. Generalized weakness  R53.1 780.79   2. Urinary tract infection without hematuria, site unspecified  N39.0 599.0   3. Hypokalemia  E87.6 276.8     Patient Active Problem List   Diagnosis    Acute UTI    Leukocytosis    Weakness    Falls    Essential hypertension    Anemia    Hypothyroidism (acquired)     Past Medical History:   Diagnosis Date    Arthritis     Disease of thyroid gland     Elevated cholesterol      History reviewed. No pertinent surgical history.   General Information       Row Name 23 0945 2328       Physical Therapy Time and Intention    Document Type therapy note (daily note)  -SAVANNAH (r) SOLITARIO (t) SAVANNAH (c) therapy note (daily note)  -SAVANNAH    Mode of Treatment individual therapy;physical therapy  -SAVANNAH (r) SOLITARIO (t) SAVANNAH (c) individual therapy;physical therapy  -      Row Name 23 0945          General Information    Patient Profile Reviewed yes  -SAVANNAH (r) SOLITARIO (t) SAVANNAH (c)       Row Name 23 0906          Safety Issues, Functional Mobility    Comment, Safety Issues/Impairments (Mobility) gait belt and non skid socks worn during session  -SAVANNAH (r) CURTISY (t) SAVANNAH (c)               User Key  (r) = Recorded By, (t) = Taken By, (c) = Cosigned By      Initials Name Provider Type    Paty Abrams, PT Physical Therapist    Kurt Vargas, PTA Student PTA Student                   Mobility       Row Name 23 0999          Bed Mobility    Bed Mobility supine-sit;scooting/bridging  -SAVANNAH (r) CURTISY (t) SAVANNAH (c)     Scooting/Bridging [place] (Bed Mobility) supervision  -SAVANNAH (r) SOLITARIO (t) SAVANNAH (c)     Supine-Sit [place] (Bed Mobility) standby assist  -SAVANNAH (r) JY (t) KH (c)       Row Name 23 0909          Sit-Stand Transfer    Sit-Stand [place] (Transfers) contact guard  -SAVANNAH (r) SOLITARIO (t) SAVANNAH (c)     Assistive  Device (Sit-Stand Transfers) walker, front-wheeled  -SAVANNAH (r) SOLITARIO (t) SAVANNAH (c)     Comment, (Sit-Stand Transfer) 2 STS from EOB to Rwx, 1 STS from bathroom commode to Rwx, 1 STS from chair to Rwx  -SAVANNAH (r) SOLITARIO (t) SAVANNAH (c)       Row Name 11/16/23 0947          Gait/Stairs (Locomotion)    [place] Level (Gait) contact guard  -SAVANNAH (r) CURTISY (t) SAVANNAH (c)     Assistive Device (Gait) walker, front-wheeled  -KH (r) CURTISY (t) SAVANNAH (c)     Patient was able to Ambulate yes  -SAVANNAH (r) SOLITARIO (t) SAVANNAH (c)     Distance in Feet (Gait) 110'  -SAVANNAH (r) SOLITARIO (t) SAVANNAH (c)     Bilateral Gait Deviations heel strike decreased  -KH (r) SOLITARIO (t) SAVANNAH (c)     Comment, (Gait/Stairs) Pt has made recommended posture and stance corrections to gait. Has short shuffled steps w Rwx and arms are still a bit shaky, but this is baseline for her.  -SAVANNAH (r) CURTISY (t) SAVANNAH (c)               User Key  (r) = Recorded By, (t) = Taken By, (c) = Cosigned By      Initials Name Provider Type    Paty Abrams, PT Physical Therapist    Kurt Vargas, PTA Student PTA Student                   Obj/Interventions       Row Name 11/16/23 0952          Motor Skills    Therapeutic Exercise --  Seated EOB AROM Marches, AP, LAQ x 20 reps, Shoulder flex x 10 reps Standing @ Rwx Mini-Squats x 8 reps before fatigued  -SAVANNAH (r) SOLITARIO (t) SAVANNAH (c)       Row Name 11/16/23 0952          Balance    Balance Assessment sitting static balance;sitting dynamic balance;standing static balance  -SAVANNAH (r) SOLITARIO (t) SAVANNAH (c)     Static Sitting Balance supervision  -SAVANNAH (r) SOLITARIO (t) SAVANNAH (c)     Dynamic Sitting Balance standby assist  -SAVANNAH (r) SOLITARIO (t) SAVANNAH (c)     Position, Sitting Balance sitting edge of bed  -SAVANNAH (r) SOLITARIO (t) SAVANNAH (c)     Static Standing Balance contact guard  -SAVANNAH (r) CURTISY (t) SAVANNAH (c)     Dynamic Standing Balance contact guard  -KH (r) JY (t) KH (c)     Position/Device Used, Standing Balance walker, front-wheeled  -KH (r) JY (t) KH (c)               User Key  (r) = Recorded By, (t) = Taken By, (c) = Cosigned By       Initials Name Provider Type    Paty Abrams, PT Physical Therapist    Kurt Vargas, PTA Student PTA Student                   Goals/Plan    No documentation.                  Clinical Impression       Row Name 11/16/23 0923          Pain    Pre/Posttreatment Pain Comment No signs or c/o pain  -SAVANNAH (r) SOLITARIO (t) SAVANNAH (c)     Additional Documentation Pain Scale: FACES Pre/Post-Treatment (Group)  -SAVANNAH (r) SOLITARIO (t) SAVANNAH (c)       Row Name 11/16/23 9763          Pain Scale: FACES Pre/Post-Treatment    Pain: FACES Scale, Pretreatment 0-->no hurt  -SAVANNAH (r) SOLITARIO (t) SAVANNAH (c)     Posttreatment Pain Rating 0-->no hurt  -SAVANNAH (r) SOLITARIO (t) SAVANNAH (c)       Row Name 11/16/23 7482          Plan of Care Review    Plan of Care Reviewed With patient  -SAVANNAH (r) SOLITARIO (t) SAVANNAH (c)     Outcome Evaluation Pt eager to participate in PT tx today and says she is ready to go home. Pt performed 2 STS from EOB to Rwx (I), 1 STS from bathroom commode to Rwx (I). Pt says she uses a counter on R side at home to get off commode so therapist replicated that support. Pt also able to perform (I) STS from chair to Rwx. Pt ambulated 110' in [place] and showed improvement in posture and widened stance from cues in previous sessions. Pt performed seated EOB ther ex and mini squats at Rwx before feeling fatigued and needing to rest in bed.  -SAVANNAH (r) SOLITARIO (t) SAVANNAH (c)       Row Name 11/16/23 7955          Positioning and Restraints    Pre-Treatment Position in bed  -SAVANNAH (r) SOLITARIO (t) SAVANNAH (c)     Post Treatment Position bed  -SAVANNAH (r) SOLITARIO (t) SAVANNAH (c)     In Bed fowlers;encouraged to call for assist;call light within reach  -SAVANNAH (r) JY (t) SAVANNAH (c)               User Key  (r) = Recorded By, (t) = Taken By, (c) = Cosigned By      Initials Name Provider Type    Paty Abrams, PT Physical Therapist    Kurt Vargas, PTA Student PTA Student                   Outcome Measures       Row Name 11/16/23 1000          How much help from another person do you currently need...    Turning  from your back to your side while in flat bed without using bedrails? 4  -KH (r) JY (t) KH (c)     Moving from lying on back to sitting on the side of a flat bed without bedrails? 4  -KH (r) JY (t) KH (c)     Moving to and from a bed to a chair (including a wheelchair)? 3  -KH (r) JY (t) KH (c)     Standing up from a chair using your arms (e.g., wheelchair, bedside chair)? 3  -KH (r) JY (t) KH (c)     Climbing 3-5 steps with a railing? 3  -KH (r) JY (t) KH (c)     To walk in hospital room? 3  -KH (r) JY (t) KH (c)     AM-PAC 6 Clicks Score (PT) 20  -KH (r) JY (t)     Highest Level of Mobility Goal 6 --> Walk 10 steps or more  -KH (r) JY (t)       Row Name 11/16/23 1000          Functional Assessment    Outcome Measure Options AM-PAC 6 Clicks Basic Mobility (PT)  -KH (r) JY (t) KH (c)               User Key  (r) = Recorded By, (t) = Taken By, (c) = Cosigned By      Initials Name Provider Type    Paty Abrams, PT Physical Therapist    Kurt Vargas, PTA Student PTA Student                                 Physical Therapy Education       Title: PT OT SLP Therapies (Done)       Topic: Physical Therapy (Done)       Point: Mobility training (Done)       Learning Progress Summary             Patient GUY Tong,ALF, NIA,VU by SOLITARIO at 11/16/2023 1001    GUY Tong DU,VU by SOLITARIO at 11/15/2023 1004    GUY Tong, VU,DU by SOLITARIO at 11/14/2023 1552    Acceptance, E, VU,NR by SAVANNAH at 11/12/2023 1635                         Point: Home exercise program (Done)       Learning Progress Summary             Patient GUY Tong,ALF, NIA,VU by SOLITARIO at 11/16/2023 1001    GUY Tong, NIA,VU by SOLITARIO at 11/15/2023 1004    GUY Tong, GERARDO,DU by SOLITARIO at 11/14/2023 1552    Acceptance, E, VU,NR by SAVANNAH at 11/12/2023 1635                         Point: Body mechanics (Done)       Learning Progress Summary             Patient GUY Tong D, DU, VU by SOLITARIO at 11/16/2023 1001    GUY Tong DU, VU by SOLITARIO at 11/15/2023 1004    GUY Tong VU, DU by SOLITARIO at 11/14/2023 1552    Acceptance,  GUY, VU,NR by SAVANNAH at 11/12/2023 1635                         Point: Precautions (Done)       Learning Progress Summary             Patient Eager, GUY,D, DU,VU by SOLITARIO at 11/16/2023 1001    Ran, GUY, NIA,VU by SOLITARIO at 11/15/2023 1004    Eager, GUY, GERARDO,DU by SOLITARIO at 11/14/2023 1552    Acceptance, E, VU,NR by  at 11/12/2023 1635                                         User Key       Initials Effective Dates Name Provider Type Discipline     07/11/23 -  Paty Nowak, PT Physical Therapist PT     11/08/23 -  Kurt Weaver, PTA Student PTA Student PT                  PT Recommendation and Plan     Plan of Care Reviewed With: patient  Outcome Evaluation: Pt eager to participate in PT tx today and says she is ready to go home. Pt performed 2 STS from EOB to Rwx (I), 1 STS from bathroom commode to Rwx (I). Pt says she uses a counter on R side at home to get off commode so therapist replicated that support. Pt also able to perform (I) STS from chair to Rwx. Pt ambulated 110' in [place] and showed improvement in posture and widened stance from cues in previous sessions. Pt performed seated EOB ther ex and mini squats at Rwx before feeling fatigued and needing to rest in bed.     Time Calculation:         PT Charges       Row Name 11/16/23 1001             Time Calculation    Start Time 0915  -KH (r) JY (t) KH (c)      Stop Time 0940  -KH (r) JY (t) KH (c)      Time Calculation (min) 25 min  -KH (r) JY (t)      PT Received On 11/16/23  -KH (r) JY (t) KH (c)      PT - Next Appointment 11/17/23  -KH (r) JY (t) KH (c)         Time Calculation- PT    Total Timed Code Minutes- PT 25 minute(s)  -KH (r) JY (t) KH (c)         Timed Charges    62310 - PT Therapeutic Exercise Minutes 8  -KH (r) JY (t) KH (c)      30295 - PT Therapeutic Activity Minutes 17  -KH (r) JY (t) KH (c)         Total Minutes    Timed Charges Total Minutes 25  -KH (r) JY (t)       Total Minutes 25  -KH (r) JY (t)                User Key  (r) = Recorded By, (t) =  Taken By, (c) = Cosigned By      Initials Name Provider Type    Paty Abrams, PT Physical Therapist    Kurt Vargas, СЕРГЕЙ Student PTA Student                  Therapy Charges for Today       Code Description Service Date Service Provider Modifiers Qty    94397769951 HC PT THERAPEUTIC ACT EA 15 MIN 11/15/2023 Kurt Weaver, СЕРГЕЙ Student GP 2    17420383955 HC PT THER PROC EA 15 MIN 11/16/2023 Kurt Weaver PTA Student GP 1    72626239859 HC PT THERAPEUTIC ACT EA 15 MIN 11/16/2023 Kurt Weaver PTA Student GP 1            PT G-Codes  Outcome Measure Options: AM-PAC 6 Clicks Basic Mobility (PT)  AM-PAC 6 Clicks Score (PT): 20  AM-PAC 6 Clicks Score (OT): 15       Kurt Weaver PTA Student  11/16/2023

## 2023-11-16 NOTE — PROGRESS NOTES
Continued Stay Note  [facility]     Patient Name: Sammi Curtis  MRN: [id number]  Today's Date: 11/16/2023    Admit Date: 11/9/2023    Plan: Insurance approved SNF   Discharge Plan       Row Name 11/16/23 1116       Plan    Plan Insurance approved SNF    Plan Comments Miles with [facility] called and said pt was approved to come to SNF, informed pt, but she said she prefers to go home with home health. CALEBnani      Row Name 11/16/23 1101       Plan    Plan Plan home with home Health    Plan Comments Spoke to patient and her daughter by phone, regarding discharge plan. [facility], has submitted to get precert, and insurance has requested more information. PT and OT are both recommending patient go home with home health, and I explained to bothe patient and her daughter, that because she is walking 110 feet and doing so well, it is likely insurnce is not going to approve skilled care at a facility. I told her they would approve home health, for PT, OT, and nursing. Daughter can drive patient home and check on her, but can not stay with her. Patient said she thinks she will be fine at home and would like home health. She could not afford other to private pay at SNF or for caregivers in the home. She is agreeable to any home health that her insurance will cover. i offered [facility], [facility], and [facility]. She was agreeable to [facility]. Referral given to Yanni with [facility] . [place]                   Discharge Codes    No documentation.                 Expected Discharge Date and Time       Expected Discharge Date Expected Discharge Time    Nov 17, 2023               Katrina Addison RN

## 2023-11-16 NOTE — PROGRESS NOTES
LOS: 0 days     Chief Complaint: Leukocytosis    Interval History: Patient seen earlier at admission due to ESBL UTI and placed on Bactrim.  Reconsulted for leukocytosis.    Patient reports she is feeling well and feels back to her baseline.  States she is feeling much stronger and would like to go home.  Denies any fevers or chills.  Denies any nausea, vomiting, diarrhea, abdominal pain, rash, shortness of breath, cough, dysuria, urinary hesitancy, urinary frequency.  Reports tolerating Bactrim well.    Vital Signs  Temp:  [97.7 °F (36.5 °C)-99.1 °F (37.3 °C)] 97.7 °F (36.5 °C)  Heart Rate:  [63-72] 63  Resp:  [16-18] 18  BP: (127-143)/(67-96) 135/79    Physical Exam:  General: In no acute distress  HEENT: Oropharynx clear, moist mucous membranes  Respiratory: Normal work of breathing  GI: Soft, NT/ND  Skin: No rashes or lesions  Extremities: No edema, cyanosis  Access: Peripheral IV    Antibiotics:  Anti-Infectives (From admission, onward)      Ordered     Dose/Rate Route Frequency Start Stop    11/10/23 1331  levoFLOXacin (LEVAQUIN) tablet 750 mg        Ordering Provider: Tacos Arreaga MD    750 mg Oral Every 24 Hours 11/10/23 1330 11/11/23 1256    11/09/23 1917  cefTRIAXone (ROCEPHIN) 1,000 mg in sodium chloride 0.9 % 100 mL IVPB-VTB        Ordering Provider: Boyd Hough MD    1,000 mg  200 mL/hr over 30 Minutes Intravenous Once 11/09/23 1933 11/09/23 2033             Results Review:     I reviewed the patient's new clinical results.    Lab Results   Component Value Date    WBC 17.67 (H) 11/16/2023    HGB 11.3 (L) 11/16/2023    HCT 34.7 11/16/2023    MCV 92.5 11/16/2023     11/16/2023     Lab Results   Component Value Date    GLUCOSE 103 (H) 11/16/2023    BUN 15 11/16/2023    CREATININE 1.03 (H) 11/16/2023    BCR 14.6 11/16/2023    CO2 27.6 11/16/2023    CALCIUM 9.9 11/16/2023    ALBUMIN 3.1 (L) 11/16/2023    LABIL2 1.1 09/17/2021    AST 39 (H) 11/16/2023    ALT 33 11/16/2023        Microbiology:  11/9 urine culture ESBL E. coli  11/9 blood cultures no growth to date    Assessment    #Leukocytosis  #ESBL E. coli UTI  #Leukocytosis improving  #MILDRED  #Mechanical fall    Patient has longstanding history of leukocytosis per records with last identifiable normal WBC count and June 2020 though there are few data points to go off of.  She did have an increase in eosinophils upon starting Bactrim and this was discontinued last night.  Would not recommend any further antibiotics at this time and if leukocytosis persists may need hematologic work-up in the future.  No reservations for discharge from an ID perspective at this time given lack of infectious symptoms and no fevers.

## 2023-11-16 NOTE — PROGRESS NOTES
Case Management Discharge Note      Final Note: Pt discharging home with [facility] and rolling walker. Her daughter is to transport. Pastora         Selected Continued Care - Admitted Since 11/9/2023       Destination    No services have been selected for the patient.                Durable Medical Equipment    No services have been selected for the patient.                Dialysis/Infusion    No services have been selected for the patient.                Home Medical Care Coordination complete.      Service Provider Selected Services Address Phone Fax Patient Preferred    [facility] [address] [place] [address], [address], [facility] [address] [phone] --              Therapy    No services have been selected for the patient.                Community Resources    No services have been selected for the patient.                Community & DME    No services have been selected for the patient.                         Final Discharge Disposition Code: 06 - home with home health care

## 2023-11-16 NOTE — DISCHARGE PLACEMENT REQUEST
"Sammi Curtis (78 y.o. Female)       Date of Birth   1945    Social Security Number       Address   [address] [place] [address]    Home Phone   [phone]    MRN   [id number]       [facility]    Marital Status                               Admission Date   11/9/23    Admission Type   Emergency    Admitting Provider   Tacos Arreaga MD    Attending Provider   Carolyn Michel MD    Department, Room/Bed   [address], [address]       Discharge Date       Discharge Disposition   Home-Health Care [facility]    Discharge Destination                                 Attending Provider: Carolyn Michel MD    Allergies: No Known Allergies    Isolation: Contact   Infection: ESBL E coli (11/12/23)   Code Status: CPR    Ht: 157.5 cm (62\")   Wt: 73.5 kg (162 lb)    Admission Cmt: None   Principal Problem: Acute UTI [N39.0]                   Active Insurance as of 11/9/2023       Primary Coverage       Payor Plan Insurance Group Employer/Plan Group    [employer] [place] MEDICARE REPLACEMENT [facility] MEDICARE REPLACEMENT        Payor Plan Address Payor Plan Phone Number Payor Plan Fax Number Effective Dates    [address] [address] [phone]  11/9/2023 - None Entered    [facility] [id number]         Subscriber Name Subscriber Birth Date Member ID       SAMMI CURTIS [id number]                     Emergency Contacts        (Rel.) Home Phone Work Phone Mobile Phone    LUCIANA DEL CID (Daughter) -- -- [phone]                "

## 2023-11-16 NOTE — PLAN OF CARE
Goal Outcome Evaluation:  Plan of Care Reviewed With: patient      Patient resting in bed.VSS.Room air.Denies pain.PRN TUMS given for heartburn.Up to BSC with assist.Afebrile.No acute changes.

## 2023-11-16 NOTE — NURSING NOTE
Nurse reviewed AVS with patient. Follow up appointments and medications verified. Walker and bedside commode delivered to unit. Daughter Daylin was called and will transport patient home.

## 2023-11-16 NOTE — DISCHARGE SUMMARY
Patient Name: Sammi Curtis  : 1945  MRN: [id number]    Date of Admission: 2023  Date of Discharge:  2023  Primary Care Physician: Dary Mcbride, JAZMYN      Discharge Diagnoses     Active Hospital Problems    Diagnosis  POA    Leukocytosis [D72.829]  Yes    Weakness [R53.1]  Yes    Falls [W19.XXXA]  Yes    Essential hypertension [I10]  Yes    Anemia [D64.9]  Yes    Hypothyroidism (acquired) [E03.9]  Yes      Resolved Hospital Problems    Diagnosis Date Resolved POA    **Acute UTI [N39.0] 2023 Yes        Hospital Course     Brief admission history and physical.  Please refer to the H&P for full details.  A pleasant 78 years old female with a past history of hypertension/hypothyroidism who presented to the hospital with recurrent falls and progressive weakness no other significant symptomatology.  Physical examination on admission included an afebrile patient with stable vital signs and no other significant abnormalities.  Hospital course.  Initial ER evaluation included a chest x-ray revealed atelectasis versus pleural scarring in both lung bases mild elevation of the right hemidiaphragm.  CT of the C-spine revealed no acute fracture or subluxation.  CT scan of the brain revealed no acute intracranial abnormalities.  CK was normal.  Lactic acid was normal.  Magnesium was normal.  CMP normal except a BUN of 45, creatinine 1.12, potassium 2.8, albumin 3.3, ALT 56, AST 82, alkaline phosphatase 166, GFR of 50.4.  UA suggestive of UTI.  CBC normal except a white count of 20.1 and hemoglobin 11.9.  Patient was admitted with falls and weakness and a UTI.  She was started on IV Rocephin and urine culture was obtained and returned for ESBL E. coli UTI and she was switched to Levaquin and subsequently to p.o. Bactrim per infectious disease.  Blood cultures returned negative.  She is status post a course of antibiotic treatment with a repeat urine was negative and.  No fever.  As far as her  weakness and fall she had initially a low potassium and that was substituted and has normalized.  Magnesium/TSH/CK were normal.  PT and OT evaluation was obtained and initially recommended SNF however the patient did remarkably well.  Sequently and the physical therapy and Occupational Therapy recommended home with home health PT and OT.  Patient was ambulating well with her walker.  CT scan of the brain without contrast and CT scan of the C-spine were negative for acute disease.  She was noted to have leukocytosis initially this was thought to be secondary to the UTI.  However the leukocytosis persisted after treatment of the UTI with a negative repeat UA and negative chest x-ray.  Review of the records revealed that she has been having leukocytosis recently.  Procalcitonin was normal.  Infectious disease did not recommend any more antibiotics.  Patient will be referred to hematology oncology as an outpatient to rule out any blood dyscrasia.  She was noted to have an elevated liver function test which was thought to be secondary to medication and this has improved during this admission.  She was noted to be anemic hemoglobin remained stable during this admission and this needs to be worked up and followed up as an outpatient.  She has a history of hypothyroidism.  We continued her current replacement.  TSH was normal.  As far as her hypertension is controlled blood pressure was a bit on the soft side and her Cozaar was decreased.  There was no evidence of angina or congestive heart failure during this admission.  At the time of discharge she was hemodynamically stable.  Follow-up with primary MD and hematology oncology and home health PT and OT.  Patient has a walker at home and a bedside commode was prescribed  Consultants     Consult Orders (all) (From admission, onward)       Start     Ordered    11/15/23 1510  Inpatient Infectious Diseases Consult  Once        Specialty:  Infectious Diseases  Provider:  Jaison  Joe Abbott, DO    11/15/23 1510    11/12/23 0850  Inpatient Infectious Diseases Consult  Once        Specialty:  Infectious Diseases  Provider:  Joe Blackwood, DO    11/12/23 0849    11/09/23 2039  [facility] (on-call MD unless specified) Details  Once        Specialty:  Hospitalist  Provider:  (Not yet assigned)    11/09/23 2038                  Procedures     Imaging Results (All)       Procedure Component Value Units Date/Time    XR Chest PA & Lateral [[id number]] Collected: 11/15/23 1849     Updated: 11/15/23 1854    Narrative:      Chest radiograph     HISTORY: Leukocytosis     TECHNIQUE: Two PA and lateral radiographs     COMPARISON: Chest radiograph 11/9/2023       Impression:      FINDINGS AND IMPRESSION:  There is no pleural effusion. No pulmonary dilatation or pneumothorax is  seen. Left basilar atelectasis and or pleural parenchymal scarring is  present, as before. Cardiac silhouette is within normal limits for size.           This report was finalized on 11/15/2023 6:51 PM by Dr. Davis Sanchez M.D on Workstation: [id number]       CT Head Without Contrast [[id number]] Collected: 11/09/23 1853     Updated: 11/09/23 1916    Narrative:      CT HEAD AND CERVICAL SPINE WITHOUT CONTRAST     CLINICAL HISTORY: [Fall]     TECHNIQUE: CT scan of the head and cervical spine was obtained with  axial soft tissue and bone algorithm images. No intravenous contrast was  administered. Sagittal and coronal reconstructions were obtained.     COMPARISON: [None available]     FINDINGS:  No intracranial hemorrhage.  No midline shift or mass effect.   No CT evidence of acute territorial infarction.  No extraaxial  collection.  No hydrocephalus.  Clear visualized portions of the  paranasal sinuses and mastoid air cells.     Straightening of the normal cervical lordosis with minimal C3 on C4  anterolisthesis and minimal C5 on C6 retrolisthesis. Multilevel disc  degeneration, moderate at C4-C7. Multilevel facet arthropathy,  moderate  bilaterally at C2-C6. Likely mild spinal canal stenosis at C4-C7  secondary to posterior disc osteophyte complexes. No prevertebral soft  tissue swelling.          Impression:      1. No acute intracranial abnormality.  2. No cervical spine fracture or traumatic subluxation. Degenerative  changes as above.              Radiation dose reduction techniques were utilized, including automated  exposure control and exposure modulation based on body size.     This report was finalized on 11/9/2023 7:13 PM by Dr. Rashad Quintero M.D on Workstation: [id number]       CT Cervical Spine Without Contrast [[id number]] Collected: 11/09/23 1853     Updated: 11/09/23 1916    Narrative:      CT HEAD AND CERVICAL SPINE WITHOUT CONTRAST     CLINICAL HISTORY: [Fall]     TECHNIQUE: CT scan of the head and cervical spine was obtained with  axial soft tissue and bone algorithm images. No intravenous contrast was  administered. Sagittal and coronal reconstructions were obtained.     COMPARISON: [None available]     FINDINGS:  No intracranial hemorrhage.  No midline shift or mass effect.   No CT evidence of acute territorial infarction.  No extraaxial  collection.  No hydrocephalus.  Clear visualized portions of the  paranasal sinuses and mastoid air cells.     Straightening of the normal cervical lordosis with minimal C3 on C4  anterolisthesis and minimal C5 on C6 retrolisthesis. Multilevel disc  degeneration, moderate at C4-C7. Multilevel facet arthropathy, moderate  bilaterally at C2-C6. Likely mild spinal canal stenosis at C4-C7  secondary to posterior disc osteophyte complexes. No prevertebral soft  tissue swelling.          Impression:      1. No acute intracranial abnormality.  2. No cervical spine fracture or traumatic subluxation. Degenerative  changes as above.              Radiation dose reduction techniques were utilized, including automated  exposure control and exposure modulation based on body size.     This report  was finalized on 11/9/2023 7:13 PM by Dr. Rashad Quintero M.D on Workstation: [id number]       XR Chest 1 View [[id number]] Collected: 11/09/23 1908     Updated: 11/09/23 1912    Narrative:      Portable chest radiograph     HISTORY: Weakness     TECHNIQUE: Single AP portable radiograph of the chest     COMPARISON: None       Impression:      FINDINGS AND IMPRESSION:  Minimal atelectasis and or pleural parenchymal scarring is present  within the bilateral lung bases. No pneumothorax or pleural effusion is  seen. Cardiac silhouette is within normal its for size. Mild asymmetric  elevation of the right ami diaphragm.     This report was finalized on 11/9/2023 7:09 PM by Dr. Davis Sanchez M.D  on Workstation: [id number]               Pertinent Labs     Results from last 7 days   Lab Units 11/16/23  0618 11/15/23  0608 11/14/23  0702 11/13/23  0655   WBC 10*3/mm3 17.67* 18.16* 17.55* 14.30*   HEMOGLOBIN g/dL 11.3* 11.7* 11.4* 11.1*   PLATELETS 10*3/mm3 338 341 342 311     Results from last 7 days   Lab Units 11/16/23  0618 11/15/23  0608 11/14/23  0702 11/13/23  0654   SODIUM mmol/L 138 137 135* 137   POTASSIUM mmol/L 4.4 3.9 3.9 3.7   CHLORIDE mmol/L 103 102 103 104   CO2 mmol/L 27.6 27.3 24.3 25.7   BUN mg/dL 15 12 11 11   CREATININE mg/dL 1.03* 0.86 0.88 0.75   GLUCOSE mg/dL 103* 103* 93 91   Estimated Creatinine Clearance: 42.3 mL/min (A) (by C-G formula based on SCr of 1.03 mg/dL (H)).  Results from last 7 days   Lab Units 11/16/23  0618 11/15/23  0608 11/14/23  0702 11/13/23  0654   ALBUMIN g/dL 3.1* 3.1* 2.8* 2.8*   BILIRUBIN mg/dL 0.3 0.3 0.4 0.5   ALK PHOS U/L 103 103 109 109   AST (SGOT) U/L 39* 43* 46* 50*   ALT (SGPT) U/L 33 36* 41* 47*     Results from last 7 days   Lab Units 11/16/23  0618 11/15/23  0608 11/14/23  0702 11/13/23  1529 11/13/23  0654 11/11/23  0817 11/10/23  0642 11/09/23  1944   CALCIUM mg/dL 9.9 9.6 9.2  --  8.9   < > 9.0 9.6   ALBUMIN g/dL 3.1* 3.1* 2.8*  --  2.8*   < > 2.9* 3.3*  "  MAGNESIUM mg/dL  --   --   --  1.6  --   --  2.1 1.9    < > = values in this interval not displayed.       Results from last 7 days   Lab Units 11/13/23  1529 11/09/23 1944   CK TOTAL U/L 40 95           Invalid input(s): \"LDLCALC\"  Results from last 7 days   Lab Units 11/09/23  1945/23 1944 11/09/23 1855   BLOODCX  No growth at 5 days No growth at 5 days  --    URINECX   --   --  >100,000 CFU/mL Escherichia coli ESBL*     Imaging Results (Last 24 Hours)       Procedure Component Value Units Date/Time    XR Chest PA & Lateral [[id number]] Collected: 11/15/23 1849     Updated: 11/15/23 1854    Narrative:      Chest radiograph     HISTORY: Leukocytosis     TECHNIQUE: Two PA and lateral radiographs     COMPARISON: Chest radiograph 11/9/2023       Impression:      FINDINGS AND IMPRESSION:  There is no pleural effusion. No pulmonary dilatation or pneumothorax is  seen. Left basilar atelectasis and or pleural parenchymal scarring is  present, as before. Cardiac silhouette is within normal limits for size.           This report was finalized on 11/15/2023 6:51 PM by Dr. Davis Sanchez M.D on Workstation: [id number]               Test Results Pending at Discharge         Discharge Exam   Physical Exam  Vitals.  Temperature 97.7 a pulse of 68 respirate rate of 18 blood pressure 135/79 and an O2 sats of 91% on room air  General.  Elderly female.  Alert and oriented x4.  In no apparent pain/distress/diaphoresis.  Normal mood and affect  Eyes.  Pupils equal round and reactive.  Intact extraocular musculature.  No pallor or jaundice.  Oral cavity.  Moist mucous membrane.  Neck.  Supple.  No JVD.  No lymphadenopathy or thyromegaly.  Cardiovascular.  Regular rate and rhythm grade 2 systolic murmur.  Chest.  Clear to auscultation bilaterally with scattered bilateral rhonchi.  .  No CVA tenderness.  Abdomen.  Soft lax.  No tenderness.  No organomegaly.  No guarding or rebound.  Extremities.  No clubbing/cyanosis.  Trace " bilateral lower extremity edema.  Discharge Details        Discharge Medications        Changes to Medications        Instructions Start Date   losartan 25 MG tablet  Commonly known as: COZAAR  What changed:   medication strength  how much to take   25 mg, Oral, Daily   Start Date: November 17, 2023            Continue These Medications        Instructions Start Date   levothyroxine 112 MCG tablet  Commonly known as: SYNTHROID, LEVOTHROID   112 mcg, Oral, Daily      pantoprazole 40 MG EC tablet  Commonly known as: PROTONIX   40 mg, Oral, Daily      sertraline 50 MG tablet  Commonly known as: ZOLOFT   50 mg, Oral, Daily             Stop These Medications      diclofenac-miSOPROStol 50-0.2 MG EC tablet  Commonly known as: ARTHROTEC 50     gabapentin 800 MG tablet  Commonly known as: NEURONTIN              No Known Allergies      Discharge Disposition:  Condition: Stable    Diet:   Diet Order   Procedures    Diet: Regular/House Diet; Texture: Regular Texture (IDDSI 7); Fluid Consistency: Thin (IDDSI 0)       Activity:   Activity Instructions       Activity as Tolerated      Driving Restrictions      Type of Restriction: Driving    Driving Restrictions: No Driving    Other Activity Instructions      Activity Instructions: Ambulate with walker (has at home).  Home health PT and OT to evaluate and treat              CODE STATUS:    Code Status and Medical Interventions:   Ordered at: 11/09/23 2236     Code Status (Patient has no pulse and is not breathing):    CPR (Attempt to Resuscitate)     Medical Interventions (Patient has pulse or is breathing):    Full Support       No future appointments.  Additional Instructions for the Follow-ups that You Need to Schedule       Ambulatory Referral to Home Health   As directed      Face to Face Visit Date: 11/16/2023   Follow-up provider for Plan of Care?: I treated the patient in an acute care facility and will not continue treatment after discharge.   Follow-up provider:  DARY BERUMEN [[id number]]   Reason/Clinical Findings: Falls/weakness/UTI/leukocytosis   Describe mobility limitations that make leaving home difficult: As above   Nursing/Therapeutic Services Requested: Physical Therapy Occupational Therapy   PT orders: Therapeutic exercise Gait Training Transfer training Home safety assessment Strengthening   Weight Bearing Status: As Tolerated   Frequency: 1 Week 1        Call MD With Problems / Concerns   As directed      Instructions: Call MD or return to ER if recurrent falls or care/dysuria or hematuria/abdominal pain/fever or chills/chest pain or shortness of breath or palpitation    Order Comments: Instructions: Call MD or return to ER if recurrent falls or care/dysuria or hematuria/abdominal pain/fever or chills/chest pain or shortness of breath or palpitation         Discharge Follow-up with PCP   As directed       Currently Documented PCP:    Dary Berumen APRN    PCP Phone Number:    [phone]     Follow Up Details: Primary knee.  1 week.  ESBL E. coli UTI/weakness and falls/leukocytosis/elevated liver function test/anemia/hypothyroidism/hypertension        Discharge Follow-up with Specified Provider: Hematology oncology; 2 Weeks   As directed      To: Hematology oncology   Follow Up: 2 Weeks   Follow Up Details: Leukocytosis without infectious cause.  Rule out blood dyscrasia               Contact information for follow-up providers       Dary Berumen APRN .    Specialty: Family Medicine  Why: Primary knee.  1 week.  ESBL E. coli UTI/weakness and falls/leukocytosis/elevated liver function test/anemia/hypothyroidism/hypertension  Contact information:  [address] [facility] [address]  [phone]                       Contact information for after-discharge care       Destination       [facility] .    Service: Skilled Nursing  Contact information:  [address] [facility]  [address]  [phone]                                     Time Spent on Discharge:  Greater than 30 minutes      Carolyn Michel MD  [facility]  11/16/23  12:33 EST       Addendum.  11/16/2023.  2:30 PM.  Reasoning for bedside commode.  Patient is physically incapable of accessing and or utilizing regular toilet facilities safely and independently within the home and will require a bedside commode. Patient is confined to a single room.

## 2023-11-16 NOTE — PLAN OF CARE
Goal Outcome Evaluation:  Plan of Care Reviewed With: patient           Outcome Evaluation: Pt eager to participate in PT tx today and says she is ready to go home. Pt performed 2 STS from EOB to Rwx (I), 1 STS from bathroom commode to Rwx (I). Pt says she uses a counter on R side at home to get off commode so therapist replicated that support. Pt also able to perform (I) STS from chair to Rwx. Pt ambulated 110' in [place] and showed improvement in posture and widened stance from cues in previous sessions. Pt performed seated EOB ther ex and mini squats at Rwx before feeling fatigued and needing to rest in bed.

## 2023-11-16 NOTE — PLAN OF CARE
Goal Outcome Evaluation:                   Patient with discharge orders. Daughter will transport patient home. Walker will be delivered to the unit before patient is discharged. No acute distress noted at this time. Will continue to monitor until discharge complete.